# Patient Record
Sex: FEMALE | Race: WHITE | NOT HISPANIC OR LATINO | Employment: FULL TIME | URBAN - METROPOLITAN AREA
[De-identification: names, ages, dates, MRNs, and addresses within clinical notes are randomized per-mention and may not be internally consistent; named-entity substitution may affect disease eponyms.]

---

## 2017-02-09 ENCOUNTER — APPOINTMENT (OUTPATIENT)
Dept: WOMENS IMAGING | Facility: HOSPITAL | Age: 52
End: 2017-02-09

## 2017-02-09 PROCEDURE — 77067 SCR MAMMO BI INCL CAD: CPT | Performed by: RADIOLOGY

## 2017-02-09 PROCEDURE — 77063 BREAST TOMOSYNTHESIS BI: CPT | Performed by: RADIOLOGY

## 2018-02-15 ENCOUNTER — APPOINTMENT (OUTPATIENT)
Dept: WOMENS IMAGING | Facility: HOSPITAL | Age: 53
End: 2018-02-15

## 2018-02-15 PROCEDURE — 77067 SCR MAMMO BI INCL CAD: CPT | Performed by: RADIOLOGY

## 2018-02-15 PROCEDURE — 77063 BREAST TOMOSYNTHESIS BI: CPT | Performed by: RADIOLOGY

## 2018-05-17 ENCOUNTER — OFFICE VISIT CONVERTED (OUTPATIENT)
Dept: CARDIOLOGY | Facility: CLINIC | Age: 53
End: 2018-05-17
Attending: INTERNAL MEDICINE

## 2018-05-29 ENCOUNTER — OFFICE VISIT CONVERTED (OUTPATIENT)
Dept: FAMILY MEDICINE CLINIC | Facility: CLINIC | Age: 53
End: 2018-05-29
Attending: FAMILY MEDICINE

## 2018-11-06 ENCOUNTER — OFFICE VISIT CONVERTED (OUTPATIENT)
Dept: FAMILY MEDICINE CLINIC | Facility: CLINIC | Age: 53
End: 2018-11-06
Attending: NURSE PRACTITIONER

## 2019-02-11 ENCOUNTER — HOSPITAL ENCOUNTER (OUTPATIENT)
Dept: URGENT CARE | Facility: CLINIC | Age: 54
Discharge: HOME | End: 2019-02-11
Attending: FAMILY MEDICINE

## 2019-02-13 LAB — BACTERIA SPEC AEROBE CULT: NORMAL

## 2019-04-24 ENCOUNTER — APPOINTMENT (OUTPATIENT)
Dept: WOMENS IMAGING | Facility: HOSPITAL | Age: 54
End: 2019-04-24

## 2019-04-24 PROCEDURE — 77063 BREAST TOMOSYNTHESIS BI: CPT | Performed by: RADIOLOGY

## 2019-04-24 PROCEDURE — 77067 SCR MAMMO BI INCL CAD: CPT | Performed by: RADIOLOGY

## 2019-05-29 ENCOUNTER — HOSPITAL ENCOUNTER (OUTPATIENT)
Dept: LAB | Facility: HOSPITAL | Age: 54
Discharge: HOME | End: 2019-05-29
Attending: FAMILY MEDICINE

## 2019-05-29 LAB
25(OH)D3 SERPL-MCNC: 39.3 NG/ML (ref 30–100)
ALBUMIN SERPL-MCNC: 4.6 G/DL (ref 3.5–5)
ALBUMIN/GLOB SERPL: 1.5 {RATIO} (ref 1.4–2.6)
ALP SERPL-CCNC: 65 U/L (ref 53–141)
ALT SERPL-CCNC: 19 U/L (ref 10–40)
ANION GAP SERPL CALC-SCNC: 19 MMOL/L (ref 8–19)
AST SERPL-CCNC: 20 U/L (ref 15–50)
BASOPHILS # BLD AUTO: 0.03 10*3/UL (ref 0–0.2)
BASOPHILS NFR BLD AUTO: 0.6 % (ref 0–3)
BILIRUB SERPL-MCNC: 0.62 MG/DL (ref 0.2–1.3)
BUN SERPL-MCNC: 18 MG/DL (ref 5–25)
BUN/CREAT SERPL: 17 {RATIO} (ref 6–20)
CALCIUM SERPL-MCNC: 9.8 MG/DL (ref 8.7–10.4)
CHLORIDE SERPL-SCNC: 105 MMOL/L (ref 99–111)
CHOLEST SERPL-MCNC: 198 MG/DL (ref 107–200)
CHOLEST/HDLC SERPL: 2.1 {RATIO} (ref 3–6)
CONV ABS IMM GRAN: 0.01 10*3/UL (ref 0–0.2)
CONV CO2: 26 MMOL/L (ref 22–32)
CONV CREATININE URINE, RANDOM: 210.2 MG/DL (ref 10–300)
CONV IMMATURE GRAN: 0.2 % (ref 0–1.8)
CONV MICROALBUM.,U,RANDOM: 12.3 MG/L (ref 0–20)
CONV TOTAL PROTEIN: 7.6 G/DL (ref 6.3–8.2)
CREAT UR-MCNC: 1.04 MG/DL (ref 0.5–0.9)
DEPRECATED RDW RBC AUTO: 44.3 FL (ref 36.4–46.3)
EOSINOPHIL # BLD AUTO: 0.13 10*3/UL (ref 0–0.7)
EOSINOPHIL # BLD AUTO: 2.7 % (ref 0–7)
ERYTHROCYTE [DISTWIDTH] IN BLOOD BY AUTOMATED COUNT: 13.6 % (ref 11.7–14.4)
GFR SERPLBLD BASED ON 1.73 SQ M-ARVRAT: >60 ML/MIN/{1.73_M2}
GLOBULIN UR ELPH-MCNC: 3 G/DL (ref 2–3.5)
GLUCOSE SERPL-MCNC: 92 MG/DL (ref 65–99)
HBA1C MFR BLD: 14.3 G/DL (ref 12–16)
HCT VFR BLD AUTO: 44.7 % (ref 37–47)
HDLC SERPL-MCNC: 93 MG/DL (ref 40–60)
LDLC SERPL CALC-MCNC: 91 MG/DL (ref 70–100)
LYMPHOCYTES # BLD AUTO: 1.34 10*3/UL (ref 1–5)
MCH RBC QN AUTO: 28.6 PG (ref 27–31)
MCHC RBC AUTO-ENTMCNC: 32 G/DL (ref 33–37)
MCV RBC AUTO: 89.4 FL (ref 81–99)
MICROALBUMIN/CREAT UR: 5.9 MG/G{CRE} (ref 0–35)
MONOCYTES # BLD AUTO: 0.41 10*3/UL (ref 0.2–1.2)
MONOCYTES NFR BLD AUTO: 8.4 % (ref 3–10)
NEUTROPHILS # BLD AUTO: 2.96 10*3/UL (ref 2–8)
NEUTROPHILS NFR BLD AUTO: 60.6 % (ref 30–85)
NRBC CBCN: 0 % (ref 0–0.7)
OSMOLALITY SERPL CALC.SUM OF ELEC: 304 MOSM/KG (ref 273–304)
PLATELET # BLD AUTO: 200 10*3/UL (ref 130–400)
PMV BLD AUTO: 11.6 FL (ref 9.4–12.3)
POTASSIUM SERPL-SCNC: 4.4 MMOL/L (ref 3.5–5.3)
RBC # BLD AUTO: 5 10*6/UL (ref 4.2–5.4)
SODIUM SERPL-SCNC: 146 MMOL/L (ref 135–147)
T4 FREE SERPL-MCNC: 1.8 NG/DL (ref 0.9–1.8)
TRIGL SERPL-MCNC: 68 MG/DL (ref 40–150)
TSH SERPL-ACNC: 0.85 M[IU]/L (ref 0.27–4.2)
VARIANT LYMPHS NFR BLD MANUAL: 27.5 % (ref 20–45)
VLDLC SERPL-MCNC: 14 MG/DL (ref 5–37)
WBC # BLD AUTO: 4.88 10*3/UL (ref 4.8–10.8)

## 2019-06-27 ENCOUNTER — OFFICE VISIT CONVERTED (OUTPATIENT)
Dept: FAMILY MEDICINE CLINIC | Facility: CLINIC | Age: 54
End: 2019-06-27
Attending: NURSE PRACTITIONER

## 2019-06-27 ENCOUNTER — HOSPITAL ENCOUNTER (OUTPATIENT)
Dept: LAB | Facility: HOSPITAL | Age: 54
Discharge: HOME | End: 2019-06-27
Attending: NURSE PRACTITIONER

## 2019-06-27 LAB
ALBUMIN SERPL-MCNC: 4.7 G/DL (ref 3.5–5)
ALBUMIN/GLOB SERPL: 1.8 {RATIO} (ref 1.4–2.6)
ALP SERPL-CCNC: 62 U/L (ref 53–141)
ALT SERPL-CCNC: 22 U/L (ref 10–40)
ANION GAP SERPL CALC-SCNC: 20 MMOL/L (ref 8–19)
AST SERPL-CCNC: 21 U/L (ref 15–50)
BILIRUB SERPL-MCNC: 0.48 MG/DL (ref 0.2–1.3)
BUN SERPL-MCNC: 18 MG/DL (ref 5–25)
BUN/CREAT SERPL: 16 {RATIO} (ref 6–20)
CALCIUM SERPL-MCNC: 9.5 MG/DL (ref 8.7–10.4)
CHLORIDE SERPL-SCNC: 102 MMOL/L (ref 99–111)
CONV CO2: 24 MMOL/L (ref 22–32)
CONV TOTAL PROTEIN: 7.3 G/DL (ref 6.3–8.2)
CREAT UR-MCNC: 1.1 MG/DL (ref 0.5–0.9)
GFR SERPLBLD BASED ON 1.73 SQ M-ARVRAT: 56 ML/MIN/{1.73_M2}
GLOBULIN UR ELPH-MCNC: 2.6 G/DL (ref 2–3.5)
GLUCOSE SERPL-MCNC: 98 MG/DL (ref 65–99)
OSMOLALITY SERPL CALC.SUM OF ELEC: 296 MOSM/KG (ref 273–304)
POTASSIUM SERPL-SCNC: 3.9 MMOL/L (ref 3.5–5.3)
SODIUM SERPL-SCNC: 142 MMOL/L (ref 135–147)

## 2019-07-25 ENCOUNTER — OFFICE VISIT CONVERTED (OUTPATIENT)
Dept: FAMILY MEDICINE CLINIC | Facility: CLINIC | Age: 54
End: 2019-07-25
Attending: FAMILY MEDICINE

## 2019-11-18 ENCOUNTER — OFFICE VISIT CONVERTED (OUTPATIENT)
Dept: SURGERY | Facility: CLINIC | Age: 54
End: 2019-11-18
Attending: SURGERY

## 2019-12-05 ENCOUNTER — HOSPITAL ENCOUNTER (OUTPATIENT)
Dept: PERIOP | Facility: HOSPITAL | Age: 54
Setting detail: HOSPITAL OUTPATIENT SURGERY
Discharge: HOME | End: 2019-12-05
Attending: SURGERY

## 2019-12-20 ENCOUNTER — OFFICE VISIT CONVERTED (OUTPATIENT)
Dept: SURGERY | Facility: CLINIC | Age: 54
End: 2019-12-20
Attending: SURGERY

## 2020-02-06 ENCOUNTER — OFFICE VISIT CONVERTED (OUTPATIENT)
Dept: FAMILY MEDICINE CLINIC | Facility: CLINIC | Age: 55
End: 2020-02-06
Attending: FAMILY MEDICINE

## 2020-07-06 ENCOUNTER — CONVERSION ENCOUNTER (OUTPATIENT)
Dept: FAMILY MEDICINE CLINIC | Facility: CLINIC | Age: 55
End: 2020-07-06

## 2020-07-06 ENCOUNTER — HOSPITAL ENCOUNTER (OUTPATIENT)
Dept: LAB | Facility: HOSPITAL | Age: 55
Discharge: HOME | End: 2020-07-06
Attending: NURSE PRACTITIONER

## 2020-07-06 ENCOUNTER — HOSPITAL ENCOUNTER (OUTPATIENT)
Dept: GENERAL RADIOLOGY | Facility: HOSPITAL | Age: 55
Discharge: HOME | End: 2020-07-06
Attending: NURSE PRACTITIONER

## 2020-07-06 ENCOUNTER — OFFICE VISIT CONVERTED (OUTPATIENT)
Dept: FAMILY MEDICINE CLINIC | Facility: CLINIC | Age: 55
End: 2020-07-06
Attending: NURSE PRACTITIONER

## 2020-07-06 LAB
AMYLASE SERPL-CCNC: 51 U/L (ref 30–110)
BASOPHILS # BLD AUTO: 0.02 10*3/UL (ref 0–0.2)
BASOPHILS NFR BLD AUTO: 0.3 % (ref 0–3)
CONV ABS IMM GRAN: 0.01 10*3/UL (ref 0–0.2)
CONV IMMATURE GRAN: 0.2 % (ref 0–1.8)
DEPRECATED RDW RBC AUTO: 42.3 FL (ref 36.4–46.3)
EOSINOPHIL # BLD AUTO: 0.07 10*3/UL (ref 0–0.7)
EOSINOPHIL # BLD AUTO: 1.1 % (ref 0–7)
ERYTHROCYTE [DISTWIDTH] IN BLOOD BY AUTOMATED COUNT: 13.1 % (ref 11.7–14.4)
HCT VFR BLD AUTO: 44.2 % (ref 37–47)
HGB BLD-MCNC: 14.4 G/DL (ref 12–16)
LIPASE SERPL-CCNC: 24 U/L (ref 5–51)
LYMPHOCYTES # BLD AUTO: 1.4 10*3/UL (ref 1–5)
LYMPHOCYTES NFR BLD AUTO: 21.9 % (ref 20–45)
MCH RBC QN AUTO: 28.9 PG (ref 27–31)
MCHC RBC AUTO-ENTMCNC: 32.6 G/DL (ref 33–37)
MCV RBC AUTO: 88.6 FL (ref 81–99)
MONOCYTES # BLD AUTO: 0.38 10*3/UL (ref 0.2–1.2)
MONOCYTES NFR BLD AUTO: 5.9 % (ref 3–10)
NEUTROPHILS # BLD AUTO: 4.52 10*3/UL (ref 2–8)
NEUTROPHILS NFR BLD AUTO: 70.6 % (ref 30–85)
NRBC CBCN: 0 % (ref 0–0.7)
PLATELET # BLD AUTO: 204 10*3/UL (ref 130–400)
PMV BLD AUTO: 11.6 FL (ref 9.4–12.3)
RBC # BLD AUTO: 4.99 10*6/UL (ref 4.2–5.4)
WBC # BLD AUTO: 6.4 10*3/UL (ref 4.8–10.8)

## 2020-07-23 ENCOUNTER — HOSPITAL ENCOUNTER (OUTPATIENT)
Dept: LAB | Facility: HOSPITAL | Age: 55
Discharge: HOME | End: 2020-07-23
Attending: FAMILY MEDICINE

## 2020-07-23 LAB
25(OH)D3 SERPL-MCNC: 33.2 NG/ML (ref 30–100)
ALBUMIN SERPL-MCNC: 4.3 G/DL (ref 3.5–5)
ALBUMIN/GLOB SERPL: 1.5 {RATIO} (ref 1.4–2.6)
ALP SERPL-CCNC: 56 U/L (ref 53–141)
ALT SERPL-CCNC: 15 U/L (ref 10–40)
ANION GAP SERPL CALC-SCNC: 19 MMOL/L (ref 8–19)
APPEARANCE UR: ABNORMAL
AST SERPL-CCNC: 19 U/L (ref 15–50)
BASOPHILS # BLD AUTO: 0.03 10*3/UL (ref 0–0.2)
BASOPHILS NFR BLD AUTO: 0.6 % (ref 0–3)
BILIRUB SERPL-MCNC: 0.39 MG/DL (ref 0.2–1.3)
BILIRUB UR QL: NEGATIVE
BUN SERPL-MCNC: 18 MG/DL (ref 5–25)
BUN/CREAT SERPL: 19 {RATIO} (ref 6–20)
CALCIUM SERPL-MCNC: 9.6 MG/DL (ref 8.7–10.4)
CHLORIDE SERPL-SCNC: 105 MMOL/L (ref 99–111)
CHOLEST SERPL-MCNC: 175 MG/DL (ref 107–200)
CHOLEST/HDLC SERPL: 2.2 {RATIO} (ref 3–6)
COLOR UR: YELLOW
CONV ABS IMM GRAN: 0.01 10*3/UL (ref 0–0.2)
CONV BACTERIA: NEGATIVE
CONV CO2: 24 MMOL/L (ref 22–32)
CONV COLLECTION SOURCE (UA): ABNORMAL
CONV CREATININE URINE, RANDOM: 198.2 MG/DL (ref 10–300)
CONV HIV-1/ HIV-2: NONREACTIVE
CONV HYALINE CASTS IN URINE MICRO: ABNORMAL /[LPF]
CONV IMMATURE GRAN: 0.2 % (ref 0–1.8)
CONV MICROALBUM.,U,RANDOM: <12 MG/L (ref 0–20)
CONV TOTAL PROTEIN: 7.1 G/DL (ref 6.3–8.2)
CONV UROBILINOGEN IN URINE BY AUTOMATED TEST STRIP: 0.2 {EHRLICHU}/DL (ref 0.1–1)
CREAT UR-MCNC: 0.97 MG/DL (ref 0.5–0.9)
DEPRECATED RDW RBC AUTO: 42.7 FL (ref 36.4–46.3)
EOSINOPHIL # BLD AUTO: 0.19 10*3/UL (ref 0–0.7)
EOSINOPHIL # BLD AUTO: 3.5 % (ref 0–7)
ERYTHROCYTE [DISTWIDTH] IN BLOOD BY AUTOMATED COUNT: 13.3 % (ref 11.7–14.4)
GFR SERPLBLD BASED ON 1.73 SQ M-ARVRAT: >60 ML/MIN/{1.73_M2}
GLOBULIN UR ELPH-MCNC: 2.8 G/DL (ref 2–3.5)
GLUCOSE SERPL-MCNC: 97 MG/DL (ref 65–99)
GLUCOSE UR QL: NEGATIVE MG/DL
HCT VFR BLD AUTO: 43 % (ref 37–47)
HDLC SERPL-MCNC: 81 MG/DL (ref 40–60)
HGB BLD-MCNC: 14 G/DL (ref 12–16)
HGB UR QL STRIP: NEGATIVE
KETONES UR QL STRIP: NEGATIVE MG/DL
LDLC SERPL CALC-MCNC: 78 MG/DL (ref 70–100)
LEUKOCYTE ESTERASE UR QL STRIP: NEGATIVE
LYMPHOCYTES # BLD AUTO: 1.45 10*3/UL (ref 1–5)
LYMPHOCYTES NFR BLD AUTO: 27 % (ref 20–45)
MCH RBC QN AUTO: 28.9 PG (ref 27–31)
MCHC RBC AUTO-ENTMCNC: 32.6 G/DL (ref 33–37)
MCV RBC AUTO: 88.7 FL (ref 81–99)
MICROALBUMIN/CREAT UR: 6.1 MG/G{CRE} (ref 0–35)
MONOCYTES # BLD AUTO: 0.38 10*3/UL (ref 0.2–1.2)
MONOCYTES NFR BLD AUTO: 7.1 % (ref 3–10)
NEUTROPHILS # BLD AUTO: 3.32 10*3/UL (ref 2–8)
NEUTROPHILS NFR BLD AUTO: 61.6 % (ref 30–85)
NITRITE UR QL STRIP: NEGATIVE
NRBC CBCN: 0 % (ref 0–0.7)
OSMOLALITY SERPL CALC.SUM OF ELEC: 300 MOSM/KG (ref 273–304)
PH UR STRIP.AUTO: 5 [PH] (ref 5–8)
PLATELET # BLD AUTO: 206 10*3/UL (ref 130–400)
PMV BLD AUTO: 11.3 FL (ref 9.4–12.3)
POTASSIUM SERPL-SCNC: 4.1 MMOL/L (ref 3.5–5.3)
PROT UR QL: NEGATIVE MG/DL
RBC # BLD AUTO: 4.85 10*6/UL (ref 4.2–5.4)
RBC #/AREA URNS HPF: ABNORMAL /[HPF]
SODIUM SERPL-SCNC: 144 MMOL/L (ref 135–147)
SP GR UR: 1.02 (ref 1–1.03)
T4 FREE SERPL-MCNC: 1.6 NG/DL (ref 0.9–1.8)
TRIGL SERPL-MCNC: 81 MG/DL (ref 40–150)
TSH SERPL-ACNC: 0.32 M[IU]/L (ref 0.27–4.2)
VLDLC SERPL-MCNC: 16 MG/DL (ref 5–37)
WBC # BLD AUTO: 5.38 10*3/UL (ref 4.8–10.8)
WBC #/AREA URNS HPF: ABNORMAL /[HPF]

## 2020-07-28 ENCOUNTER — OFFICE VISIT CONVERTED (OUTPATIENT)
Dept: FAMILY MEDICINE CLINIC | Facility: CLINIC | Age: 55
End: 2020-07-28
Attending: FAMILY MEDICINE

## 2020-08-11 ENCOUNTER — HOSPITAL ENCOUNTER (OUTPATIENT)
Dept: GENERAL RADIOLOGY | Facility: HOSPITAL | Age: 55
Discharge: HOME | End: 2020-08-11
Attending: FAMILY MEDICINE

## 2020-08-13 ENCOUNTER — HOSPITAL ENCOUNTER (OUTPATIENT)
Dept: GENERAL RADIOLOGY | Facility: HOSPITAL | Age: 55
Discharge: HOME | End: 2020-08-13
Attending: FAMILY MEDICINE

## 2020-11-19 ENCOUNTER — APPOINTMENT (OUTPATIENT)
Dept: WOMENS IMAGING | Facility: HOSPITAL | Age: 55
End: 2020-11-19

## 2020-11-19 PROCEDURE — 77063 BREAST TOMOSYNTHESIS BI: CPT | Performed by: RADIOLOGY

## 2020-11-19 PROCEDURE — 77067 SCR MAMMO BI INCL CAD: CPT | Performed by: RADIOLOGY

## 2020-12-29 ENCOUNTER — OFFICE VISIT CONVERTED (OUTPATIENT)
Dept: FAMILY MEDICINE CLINIC | Facility: CLINIC | Age: 55
End: 2020-12-29
Attending: FAMILY MEDICINE

## 2020-12-29 ENCOUNTER — CONVERSION ENCOUNTER (OUTPATIENT)
Dept: FAMILY MEDICINE CLINIC | Facility: CLINIC | Age: 55
End: 2020-12-29

## 2021-01-22 ENCOUNTER — HOSPITAL ENCOUNTER (OUTPATIENT)
Dept: GENERAL RADIOLOGY | Facility: HOSPITAL | Age: 56
Discharge: HOME | End: 2021-01-22
Attending: FAMILY MEDICINE

## 2021-05-13 NOTE — PROGRESS NOTES
Progress Note      Patient Name: Payal Eldridge   Patient ID: [id number]   Sex: Female   YOB: 1965    Primary Care Provider: Luis Carlos Rincon MD   Referring Provider: Luis Carlos Rincon MD    Visit Date: July 28, 2020    Provider: Luis Carlos Rincon MD   Location: [facility]   Location Address: [address] [place] [address], [address] [place] [address]  [id number]   Location Phone: [phone]          Chief Complaint     Follow up labs       History Of Present Illness  The patient is a 55 year old /White female who has a history of anxiety, costochondritis, IBS, hypothyroidism. She costochondritis Medications are listed in the medication list.   TESTS:     Hx of IBS.    Hx of anxiety. she has been more anxious during COVID.    SHe c/o RUQ intermittently since her gallbladder was removed last year.      Hx of hypothyroidism. controlled.    Costochondritis.  She has intermittent pain of the anterior left chest wall. takes Tylenol prn.           Past Medical History  Disease Name Date Onset Notes   Abnormal PAP Smear --  dr. marroquin   Anxiety --  --    Cardiac Dysrhythmia --  sees Dr. Villalpando.   Chemotherapy/Cancer --  melanoma on abdomen. excised. see dermatologist yearly.   Costochondritis --  sees Dr. Villalpando.   Endometriosis of uterus --  --    Gall Stones --  --    Hypothyroidism --  --    Irritable Bowel Syndrome --  sees GI doctor   Melenoma --  --    Migraine Headaches --  --    Screening Mammogram 04/2019 --          Past Surgical History  Procedure Name Date Notes   Cholecystectomy --  --    Colonoscopy 2017 --    Dilation and curretage --  --    Hysterectomy --  --    Lasik --  --    Tubal ligation --  --          Medication List  Name Date Started Instructions   Synthroid 137 mcg oral tablet 08/26/2019 take 1 tablet (137 mcg) by oral route once daily for 30 days         Allergy List  Allergen Name Date Reaction Notes   NO KNOWN DRUG ALLERGIES --  --  --    Seasonal --  --  sees goldsmith and pippa.          Family Medical History  Disease Name Relative/Age Notes   Stroke  --    Colon Cancer  dad. Diagnosed at 53.         Reproductive History  Menstrual   Certainty of LMP Date:  Menopause Status: Perimenopausal Method of Birth Control: Sterilization   Pregnancy Summary   Total Pregnancies: 4 Full Term: 0 Premature: 0   Ab Induced: 0 Ab Spontaneous: 0 Ectopics: 0   Multiples: 0 Livin         Social History  Finding Status Start/Stop Quantity Notes   Alcohol --  --/-- --  --    Financial --  --/-- --      --  --/-- --  --    Sedentary --  --/-- --  --    Tobacco Never --/-- --  --          Immunizations  NameDate Admin Mfg Trade Name Lot Number Route Inj VIS Given VIS Publication   Hepatitis [id number] NE Not Entered  NE NE     Comments:    Hepatitis [id number] NE Not Entered  NE NE     Comments:    Hepatitis A02018 NE Not Entered  NE NE     Comments:    Hepatitis A02018 NE Not Entered  NE NE     Comments:    Yvriklqgc83/30/2019 NE Not Entered  NE NE     Comments: @ work   InfluenzaRefused 2019 NE Not Entered  NE NE     Comments:    InfluenzaRefused 2019 NE Not Entered  NE NE     Comments:    Prevnar  [facility] PREVNAR 13 [id number]  [place] 2019    Comments: Pt. left office in stable condition.   Prevnar  [facility] PREVNAR 13 [id number]  [place] 2019    Comments: Pt. left office in stable condition.   Tdap03/10/2014 [facility] BOOSTRIX [id number] IM RD 03/10/2014 2012   Comments:          Review of Systems  · Constitutional  o Denies  o : fever, weight gain  · Eyes  o Denies  o : diplopia, recent changes, blurred vision, redness of eye, eye pain, discharge from eye  · HENT  o Denies  o : sore throat, hearing changes, tinnitus, nose bleeding, sinus pain, nasal discharge, ear pain  · Cardiovascular  o Denies  o : palpitation, chest pain, claudication, pedal edema  · Respiratory  o Denies  o : shortness of breath, ROBERT, PND/Orthopnea, hemoptysis, dry cough,  "productive cough  · Gastrointestinal  o Denies  o : nausea, vomiting, reflux  · Genitourinary  o Denies  o : frequency, urgency, dysuria, vaginal discharge, penile discharge, incontinence, nocturia, irregular menses, hot flashes  · Neurologic  o Denies  o : unsteady gait, weakness, dizziness, H/A  · Musculoskeletal  o Admits  o : costochondritis pain  o Denies  o : myalgias, joint pain, joint swelling  · Endocrine  o Denies  o : heat intolerance, cold intolerance, polyuria, polydipsia  · Psychiatric  o Denies  o : suicidal ideation, homicidal ideation, mood changes, hallucinations, memory  · Heme-Lymph  o Denies  o : easy bleeding, easy bruising, edema, lymph node enlargement or tenderness  · Allergic-Immunologic  o Denies  o : bees, frequent illnesses, seasonal allergies      Vitals  Date Time BP Position Site L\R Cuff Size HR RR TEMP (F) WT  HT  BMI kg/m2 BSA m2 O2 Sat HC       07/28/2020 07:13 /72 Sitting    66 - R  98 146lbs 9oz 5'  5\" 24.39 1.75 99 %          Physical Examination  · Constitutional  o Appearance  o : alert, in no acute distress, well developed, well-nourished  · Head and Face  o Head  o : normocephalic, atraumatic, non tender, no palpable masses or nodules.  o Face  o : no facial lesions  · Eyes  o Vision  o : Acuity: grossly normal at distance, Conjuntivae: Normal, Sclerae white  · Respiratory  o Auscultation of Lungs  o : normal breath sounds throughout  · Cardiovascular  o Heart  o : Regular rate and rhythm, Normal S1,S2   · Psychiatric  o Mood and Affect  o : normal mood and affect          Assessment  · Abdominal pain     789.00/R10.9  · Hypothyroidism     244.9/E03.9  · Screening for cardiovascular condition     V81.2/Z13.6  · Vitamin D deficiency     268.9/E55.9  · Routine lab draw     V72.60/Z01.89       RUQ ultrasound      f/u in 1 year.         she declines any meds to help with anxiety    get back on probiotics         Plan  · Orders  o Physical, Primary Care Panel (CBC, CMP, " Lipid, TSH) [facility] (25803, 53962, 34704, 26368) - 789.00/R10.9, 244.9/E03.9, V81.2/Z13.6, V72.60/Z01.89 - 07/28/2021  o Free T4 (44304) - 244.9/E03.9, V72.60/Z01.89 - 07/28/2021  o Urinalysis with Reflex Microscopy if abnormal ([facility]) (76241) - 789.00/R10.9, 244.9/E03.9, V72.60/Z01.89 - 07/28/2021  o Vitamin D (25-Hydroxy) Level (85915) - 268.9/E55.9 - 07/28/2021  o Urine microalbumin (60725) - 789.00/R10.9, 244.9/E03.9, V72.60/Z01.89 - 07/28/2021  o Right upper quadrant ultrasound (70296) - 789.00/R10.9 - 07/28/2020  o ACO-14: Influenza immunization administered or previously received () - - 07/28/2020  o ACO-39: Current medications updated and reviewed () - - 07/28/2020  · Medications  o Medications have been Reconciled  o Transition of Care or Provider Policy  · Instructions  o Electronically Identified Patient Education Materials Provided Electronically  · Disposition  o Call or Return if symptoms worsen or persist.  o Care Transition            Electronically Signed by: Luis Carlos Rincon MD -Author on July 28, 2020 12:42:08 PM

## 2021-05-13 NOTE — PROGRESS NOTES
Progress Note      Patient Name: Payal Eldridge   Patient ID: [id number]   Sex: Female   YOB: 1965    Primary Care Provider: Luis Carlos Rincon MD   Referring Provider: Luis Carlos Rincon MD    Visit Date: July 6, 2020    Provider: HERNAN Oswald   Location: [facility]   Location Address: [address] [place] [address], [address] [place] [address]  [id number]   Location Phone: [phone]          Chief Complaint  · right side pain x 4-5 days. Had gallbladder removed in Dec  · left thigh pain x 1 week      History Of Present Illness  Payal Eldridge is a 55 year old /White female who presents for evaluation and treatment of: pt having pain epigastric area for 4-5 days. she has some trouble with constipation. she's had some pain in her inner L thigh but thinks it could be because of how she's sitting.       Past Medical History  Disease Name Date Onset Notes   Abnormal PAP Smear --  dr. marroquin   Anxiety --  --    Cardiac Dysrhythmia --  sees Dr. Villalpando.   Chemotherapy/Cancer --  melanoma on abdomen. excised. see dermatologist yearly.   Costochondritis --  sees Dr. Villalpando.   Endometriosis of uterus --  --    Gall Stones --  --    Hypothyroidism --  --    Irritable Bowel Syndrome --  sees GI doctor   Melenoma --  --    Migraine Headaches --  --    Screening Mammogram 04/2019 --          Past Surgical History  Procedure Name Date Notes   Cholecystectomy --  --    Colonoscopy 2017 --    Dilation and curretage --  --    Hysterectomy --  --    Lasik --  --    Tubal ligation --  --          Medication List  Name Date Started Instructions   Synthroid 137 mcg oral tablet 08/26/2019 take 1 tablet (137 mcg) by oral route once daily for 30 days         Allergy List  Allergen Name Date Reaction Notes   NO KNOWN DRUG ALLERGIES --  --  --    Seasonal --  --  sees goldsmith and pippa.         Family Medical History  Disease Name Relative/Age Notes   Stroke  --    Colon Cancer  dad. Diagnosed at 53.          Reproductive History  Menstrual   Certainty of LMP Date:  Menopause Status: Perimenopausal Method of Birth Control: Sterilization   Pregnancy Summary   Total Pregnancies: 4 Full Term: 0 Premature: 0   Ab Induced: 0 Ab Spontaneous: 0 Ectopics: 0   Multiples: 0 Livin         Social History  Finding Status Start/Stop Quantity Notes   Alcohol --  --/-- --  --    Financial --  --/-- --      --  --/-- --  --    Sedentary --  --/-- --  --    Tobacco Never --/-- --  --          Immunizations  NameDate Admin Mfg Trade Name Lot Number Route Inj VIS Given VIS Publication   Hepatitis A12018 NE Not Entered  NE NE     Comments:    Hepatitis A12018 NE Not Entered  NE NE     Comments:    Hepatitis A02018 NE Not Entered  NE NE     Comments:    Hepatitis A02018 NE Not Entered  NE NE     Comments:    Qzyofhdvv04/30/2019 NE Not Entered  NE NE     Comments: @ work   InfluenzaRefused 2019 NE Not Entered  NE NE     Comments:    InfluenzaRefused 2019 NE Not Entered  NE NE     Comments:    Prevnar  [facility] PREVNAR 13 [id number]  [place] 2019    Comments: Pt. left office in stable condition.   Prevnar 1302019 [facility] PREVNAR 13 [id number]  [place] 2019    Comments: Pt. left office in stable condition.   Tdap03/10/2014 [facility] BOOSTRIX [id number] IM RD 03/10/2014 2012   Comments:          Review of Systems  · Constitutional  o Denies  o : fatigue, night sweats  · Eyes  o Denies  o : double vision, blurred vision  · HENT  o Denies  o : vertigo, recent head injury  · Breasts  o Denies  o : abnormal changes in breast size, additional breast symptoms except as noted in the HPI  · Cardiovascular  o Denies  o : chest pain, irregular heart beats  · Respiratory  o Denies  o : shortness of breath, productive cough  · Gastrointestinal  o Admits  o : has constipation probs, has epigastric pain for 4-5 days. is able to eat and drink. she was taking miralax and it worked but she stopped  "it. since her gallbladder is out she's had trouble with her bowels. she may not go for 3-4 days   o Denies  o : nausea, vomiting  · Genitourinary  o Denies  o : dysuria, urinary retention  · Integument  o Denies  o : hair growth change, new skin lesions  · Neurologic  o Denies  o : altered mental status, seizures  · Musculoskeletal  o Admits  o : she crosses and sits on her legs in a regular chair as she has been working at home.  o Denies  o : joint swelling, limitation of motion  · Endocrine  o Denies  o : cold intolerance, heat intolerance  · Heme-Lymph  o Denies  o : petechiae, lymph node enlargement or tenderness  · Allergic-Immunologic  o Denies  o : frequent illnesses      Vitals  Date Time BP Position Site L\R Cuff Size HR RR TEMP (F) WT  HT  BMI kg/m2 BSA m2 O2 Sat HC       07/06/2020 11:37 /80 Sitting    67 - R 12 97.7 146lbs 4oz 5'  5\" 24.34 1.74 98 %          Physical Examination  · Constitutional  o Appearance  o : well-nourished, well developed, alert, in no acute distress  · Neck  o Inspection/Palpation  o : normal appearance, no masses or tenderness, trachea midline, no enlarged cervical or supraclavicular lymphnodes palpated  o Thyroid  o : gland size normal, nontender, no nodules or masses present on palpation, thyroid motion normal during swallowing  · Respiratory  o Respiratory Effort  o : breathing unlabored  o Inspection of Chest  o : normal appearance, no retractions  o Auscultation of Lungs  o : normal breath sounds throughout  · Cardiovascular  o Heart  o :   § Auscultation of Heart  § : regular rate and rhythm without murmur  · Gastrointestinal  o Abdominal Examination  o : abdomen nontender to palpation, hypoactive bowel sounds present, tone normal without rigidity or guarding, no masses present,tympany on R side with percussion, more flat across lower abd  · Musculoskeletal  o General  o :   § General Musculoskeletal  § : No joint swelling or deformity., Muscle tone, strength, and " development grossly normal.  o Left Lower Extremity  o :   § Range of Motion  § : range of motion normal. pt able to demonstrate how she sits  · Skin and Subcutaneous Tissue  o General Inspection  o : no rashes or lesions present, no areas of discoloration  · Neurologic  o Mental Status Examination  o : judgement, insight intact, modd and affect appropriate  o Motor Examination  o : strength grossly intact in all four extremities  o Gait and Station  o : normal gait, able to stand without difficulty          Assessment  · Abdominal pain     789.00/R10.9  · Constipation     564.00/K59.00      Plan  · Orders  o CBC with Auto Diff [facility] (09421) - 789.00/R10.9 - 07/06/2020  o ACO-39: Current medications updated and reviewed () - - 07/06/2020  o KUB xray [facility] (43707) - 789.00/R10.9 - 07/06/2020   does have constipation problems at times  o Helicobacter pylori antibody assay by immunoassay (20679) - 789.00/R10.9 - 07/06/2020  o Serum amylase measurement (87794) - 789.00/R10.9 - 07/06/2020  o Lipase measurement (52156) - 789.00/R10.9 - 07/06/2020  o Helicobacter pylori IgM antibody assay (05059) - 789.00/R10.9 - 07/07/2020  · Medications  o Align 4 mg Oral Capsule   SIG: take 1 capsule by oral route daily   DISP: (0) capsule with 0 refills  Discontinued on 07/06/2020     o Cholestyramine Light 4 gram oral powder   SIG: take 4gms po daily as needed   DISP: (10) Packet with 5 refills  Discontinued on 07/06/2020     o Medrol (Faizan) 4 mg oral tablets,dose pack   SIG: take as directed   DISP: (1) 21 ct dose-pack with 0 refills  Discontinued on 07/06/2020     o multivitamin Oral Tablet   SIG: take 1 tablet by oral route once daily with food   DISP: (0) tablet with 0 refills  Discontinued on 07/06/2020     o Shingrix (PF) 50 mcg/0.5 mL intramuscular suspension for reconstitution   SIG: inject 0.5 milliliter (50 mcg) by intramuscular route once repeat 0.5 mL dose 2 to 6 months after the first dose (total of 2  doses)   DISP: (1) Kit with 0 refills  Discontinued on 07/06/2020     o Medications have been Reconciled  o Transition of Care or Provider Policy  · Instructions  o Instructed to seek medical attention urgently for new or worsening symptoms.  o Take all medications as prescribed/directed.  o Patient was educated/instructed on their diagnosis, treatment and medications prior to discharge from the clinic today.  o recommend she restart the miralax to get self regulated  · Disposition  o Call or Return if symptoms worsen or persist.            Electronically Signed by: Cora Farley APRN -Author on July 7, 2020 09:40:50 AM

## 2021-05-14 VITALS
SYSTOLIC BLOOD PRESSURE: 122 MMHG | OXYGEN SATURATION: 95 % | HEIGHT: 65 IN | DIASTOLIC BLOOD PRESSURE: 72 MMHG | RESPIRATION RATE: 18 BRPM | TEMPERATURE: 97.1 F | WEIGHT: 151.44 LBS | BODY MASS INDEX: 25.23 KG/M2 | HEART RATE: 63 BPM

## 2021-05-14 NOTE — PROGRESS NOTES
Progress Note      Patient Name: Payal Eldridge   Patient ID: [id number]   Sex: Female   YOB: 1965    Primary Care Provider: Luis Carlos Rincon MD   Referring Provider: Luis Carlos Rincon MD    Visit Date: December 29, 2020    Provider: Luis Carlos Rincon MD   Location: [facility]   Location Address: [address] [place] [address], [address] [place] [address]  [id number]   Location Phone: [phone]          Chief Complaint     Abd pain and burning       History Of Present Illness  Payal Eldridge is a 55 year old /White female who presents for evaluation and treatment of:      Pt presents for eval    SHe c/o intermittent bouts of (mid-epigastric) abdominalpain. Pain is intermittnet, usually worse at night. Per pt, it feels like a burning sensation. Denies blood in stool or black stools. Denies weight loss. Denies vomiting or loss of appetite.       Past Medical History  Disease Name Date Onset Notes   Abnormal PAP Smear --  dr. marroquin   Anxiety --  --    Cardiac Dysrhythmia --  sees Dr. Villalpando.   Chemotherapy/Cancer --  melanoma on abdomen. excised. see dermatologist yearly.   Costochondritis --  sees Dr. Villalpando.   Endometriosis of uterus --  --    Gall Stones --  --    Hypothyroidism --  --    Irritable Bowel Syndrome --  sees GI doctor   Melenoma --  --    Migraine Headaches --  --    Screening Mammogram 04/2019 --          Past Surgical History  Procedure Name Date Notes   Cholecystectomy --  --    Colonoscopy 2017 --    Dilation and curretage --  --    Hysterectomy --  --    Lasik --  --    Tubal ligation --  --          Medication List  Name Date Started Instructions   omeprazole 20 mg oral capsule,delayed release(/EC) 12/29/2020 take 1 capsule (20 mg) by oral route once daily before a meal for 30 days   Synthroid 137 mcg oral tablet 08/14/2020 take 1 tablet (137 mcg) by oral route once daily for 30 days         Allergy List  Allergen Name Date Reaction Notes   NO  KNOWN DRUG ALLERGIES --  --  --    Seasonal --  --  sees goldsmith and pippa.       Allergies Reconciled  Family Medical History  Disease Name Relative/Age Notes   Stroke  --    Colon Cancer  dad. Diagnosed at 53.         Reproductive History  Menstrual   Certainty of LMP Date:  Menopause Status: Perimenopausal Method of Birth Control: Sterilization   Pregnancy Summary   Total Pregnancies: 4 Full Term: 0 Premature: 0   Ab Induced: 0 Ab Spontaneous: 0 Ectopics: 0   Multiples: 0 Livin         Social History  Finding Status Start/Stop Quantity Notes   Alcohol --  --/-- --  --    Financial --  --/-- --      --  --/-- --  --    Sedentary --  --/-- --  --    Tobacco Never --/-- --  --          Immunizations  NameDate Admin Mfg Trade Name Lot Number Route Inj VIS Given VIS Publication   Hepatitis A12018 NE Not Entered  NE NE     Comments:    Hepatitis A02018 NE Not Entered  NE NE     Comments:    Zmzhelupm78/30/2019 NE Not Entered  NE NE     Comments: @ work   Prevnar 1302019 [facility] PREVNAR 13 [id number] IM [place] 2019    Comments: Pt. left office in stable condition.   Tdap03/10/2014 [facility] BOOSTRIX [id number] IM RD 03/10/2014 2012   Comments:          Review of Systems  · Constitutional  o Denies  o : night sweats  · Eyes  o Denies  o : double vision, blurred vision  · HENT  o Denies  o : vertigo, recent head injury  · Breasts  o Denies  o : abnormal changes in breast size, additional breast symptoms except as noted in the HPI  · Cardiovascular  o Denies  o : chest pain, irregular heart beats  · Respiratory  o Denies  o : shortness of breath, productive cough  · Gastrointestinal  o Denies  o : nausea, vomiting  · Genitourinary  o Denies  o : dysuria, urinary retention  · Integument  o Denies  o : hair growth change, new skin lesions  · Neurologic  o Denies  o : altered mental status, seizures  · Musculoskeletal  o Denies  o : joint swelling, limitation of motion  · Endocrine  o Denies  o : cold  "intolerance, heat intolerance  · Heme-Lymph  o Denies  o : petechiae, lymph node enlargement or tenderness  · Allergic-Immunologic  o Denies  o : frequent illnesses      Vitals  Date Time BP Position Site L\R Cuff Size HR RR TEMP (F) WT  HT  BMI kg/m2 BSA m2 O2 Sat FR L/min FiO2 HC       12/29/2020 03:06 /72 Sitting    63 - R 18 97.1 151lbs 7oz 5'  5\" 25.2 1.77 95 %  21%          Physical Examination  · Constitutional  o Appearance  o : alert, in no acute distress, well developed, well-nourished  · Head and Face  o Head  o : normocephalic, atraumatic, non tender, no palpable masses or nodules.  o Face  o : no facial lesions  · Eyes  o Vision  o : Acuity: grossly normal at distance, Conjuntivae: Normal, Sclerae white  · Respiratory  o Auscultation of Lungs  o : normal breath sounds throughout  · Cardiovascular  o Heart  o : Regular rate and rhythm, Normal S1,S2   · Psychiatric  o Mood and Affect  o : normal mood and affect          Assessment  · GERD (gastroesophageal reflux disease)     530.81/K21.9       f/u as directed.  start prilosec          Plan  · Orders  o ACO-39: Current medications updated and reviewed (, 1159F) - - 12/29/2020  o ACO-14: Influenza immunization administered or previously received [facility] () - - 12/29/2020  · Medications  o Prolia 60 mg/mL subcutaneous syringe   SIG: inject 1 milliliter (60 mg) by subcutaneous route every 6 months in the upper arm, upper thigh or abdomen   DISP: (1) 1 ml syringe with 3 refills  Discontinued on 12/29/2020     o Medications have been Reconciled  o Transition of Care or Provider Policy  · Instructions  o Patient was educated/instructed on their diagnosis, treatment and medications prior to discharge from the clinic today.  o Electronically Identified Patient Education Materials Provided Electronically  · Disposition  o Call or Return if symptoms worsen or persist.  o Care Transition            Electronically Signed by: Luis Carlos Rincon MD -Author on " December 29, 2020 04:16:56 PM

## 2021-05-15 VITALS
HEART RATE: 66 BPM | OXYGEN SATURATION: 99 % | TEMPERATURE: 98 F | BODY MASS INDEX: 24.42 KG/M2 | DIASTOLIC BLOOD PRESSURE: 72 MMHG | SYSTOLIC BLOOD PRESSURE: 138 MMHG | HEIGHT: 65 IN | WEIGHT: 146.56 LBS

## 2021-05-15 VITALS
HEART RATE: 58 BPM | DIASTOLIC BLOOD PRESSURE: 71 MMHG | WEIGHT: 144.19 LBS | HEIGHT: 65 IN | TEMPERATURE: 97.6 F | SYSTOLIC BLOOD PRESSURE: 116 MMHG | BODY MASS INDEX: 24.02 KG/M2 | OXYGEN SATURATION: 98 %

## 2021-05-15 VITALS
SYSTOLIC BLOOD PRESSURE: 136 MMHG | DIASTOLIC BLOOD PRESSURE: 71 MMHG | RESPIRATION RATE: 16 BRPM | HEART RATE: 70 BPM | WEIGHT: 144 LBS | HEIGHT: 65 IN | OXYGEN SATURATION: 98 % | BODY MASS INDEX: 23.99 KG/M2 | TEMPERATURE: 98.2 F

## 2021-05-15 VITALS
DIASTOLIC BLOOD PRESSURE: 80 MMHG | SYSTOLIC BLOOD PRESSURE: 146 MMHG | OXYGEN SATURATION: 98 % | TEMPERATURE: 97.7 F | HEIGHT: 65 IN | WEIGHT: 146.25 LBS | HEART RATE: 67 BPM | BODY MASS INDEX: 24.37 KG/M2 | RESPIRATION RATE: 12 BRPM

## 2021-05-15 VITALS — WEIGHT: 145 LBS | RESPIRATION RATE: 18 BRPM | BODY MASS INDEX: 24.16 KG/M2 | HEIGHT: 65 IN

## 2021-05-15 VITALS
WEIGHT: 144 LBS | BODY MASS INDEX: 23.99 KG/M2 | OXYGEN SATURATION: 98 % | SYSTOLIC BLOOD PRESSURE: 123 MMHG | DIASTOLIC BLOOD PRESSURE: 71 MMHG | HEART RATE: 66 BPM | TEMPERATURE: 97.3 F | HEIGHT: 65 IN

## 2021-05-15 VITALS — WEIGHT: 148 LBS | HEIGHT: 65 IN | BODY MASS INDEX: 24.66 KG/M2 | RESPIRATION RATE: 16 BRPM

## 2021-05-16 VITALS
OXYGEN SATURATION: 97 % | TEMPERATURE: 97.4 F | DIASTOLIC BLOOD PRESSURE: 77 MMHG | WEIGHT: 148.06 LBS | HEART RATE: 79 BPM | BODY MASS INDEX: 25.28 KG/M2 | HEIGHT: 64 IN | SYSTOLIC BLOOD PRESSURE: 124 MMHG

## 2021-05-16 VITALS
SYSTOLIC BLOOD PRESSURE: 130 MMHG | HEIGHT: 64 IN | WEIGHT: 145 LBS | BODY MASS INDEX: 24.75 KG/M2 | HEART RATE: 74 BPM | DIASTOLIC BLOOD PRESSURE: 98 MMHG

## 2021-05-16 VITALS
DIASTOLIC BLOOD PRESSURE: 70 MMHG | BODY MASS INDEX: 25.31 KG/M2 | TEMPERATURE: 98.1 F | RESPIRATION RATE: 17 BRPM | HEIGHT: 64 IN | HEART RATE: 69 BPM | SYSTOLIC BLOOD PRESSURE: 127 MMHG | OXYGEN SATURATION: 98 % | WEIGHT: 148.25 LBS

## 2021-05-26 ENCOUNTER — OFFICE VISIT CONVERTED (OUTPATIENT)
Dept: GASTROENTEROLOGY | Facility: CLINIC | Age: 56
End: 2021-05-26
Attending: NURSE PRACTITIONER

## 2021-06-05 NOTE — H&P
History and Physical      Patient Name: Payal Eldridge   Patient ID: [id number]   Sex: Female   YOB: 1965    Primary Care Provider: Luis Carlos Rincon MD   Referring Provider: Luis Carlos Rincon MD    Visit Date: May 26, 2021    Provider: HERNAN Bansal   Location: [facility]   Location Address: [address] [place] [address]  [place]  [id number]   Location Phone: [phone]          Chief Complaint  · Rectal bleeding      History Of Present Illness  The patient is a 56 year old /White female, who presents on referral from Luis Carlos Rincon MD, for a gastroenterology evaluation for rectal bleeding and constipation. The bleeding has been occasional and is with wiping.   She reports the bright red blood per rectum and constipation began approximately 6 months ago and now the symptom is better with no treatment.   The patient has no past history of any GI diseases.      She started having occasional rectal bleeding approximately 6 months ago.  It is bright red and occurs with wiping.  She reports she feels a little bit more constipated, having a bowel movement every other day.  She reports the number of stools depends on her food intake.  Her father was diagnosed with colon cancer at the age of 53 and passed at 54.  She does have right upper quadrant pain that started last year.  It is intermittent, and when it does occur, it would last for a day.  She describes it as a dull burn.  Nothing alleviates the pain, but leaning on a table tends to aggravate it.  At its worst, she rates it a 4 out of 10 on the pain scale.  She has had a cholecystectomy.  She denies nausea, vomiting, melena, hematemesis, and reflux.  She has been tried on Prilosec before, but the patient states it makes her stomach upset.  The patient states she tries not to take medications if she does not have to.  She does not use NSAIDs and does not smoke.  Her last colonoscopy was in 2017 at [facility].  She  "had no polyps removed.  She has been purposely trying to lose weight and has lost 10 lbs.    CT abdomen/pelvis with contrast 2021 revealed hepatic steatosis.       Past Medical History  Abnormal PAP Smear; Anxiety; Cardiac Dysrhythmia; Chemotherapy/Cancer; Costochondritis; Endometriosis of uterus; Hypothyroidism; Irritable Bowel Syndrome; Melenoma; Migraine Headaches; Screening Mammogram         Past Surgical History  Cholecystectomy; Colonoscopy; Dilation and curretage; Hysterectomy; Lasik; Tubal ligation         Medication List  Calcium 600 + D(3) 600 mg calcium- 200 unit oral capsule; Synthroid 137 mcg oral tablet         Allergy List  NO KNOWN DRUG ALLERGIES; Seasonal       Allergies Reconciled  Family Medical History  Stroke; Colon Cancer         Reproductive History   4 Para 0 0 0 2 & Perimenopausal       Social History  Alcohol; Financial; ; Sedentary; Tobacco (Never)         Immunizations  Name Date Admin   Hepatitis A 2018   Hepatitis A 2018   Hepatitis A 2018   Hepatitis A 2018   Influenza 2019   Influenza Refused   Influenza Refused   Prevnar 13 2019   Prevnar 13 2019   Tdap 03/10/2014         Review of Systems  · Constitutional  o Denies  o : chills, fever  · Eyes  o Denies  o : blurred vision, changes in vision  · Cardiovascular  o Denies  o : chest pain  · Respiratory  o Denies  o : shortness of breath  · Gastrointestinal  o Denies  o : nausea, vomiting  · Genitourinary  o Denies  o : dysuria, blood in urine  · Integument  o Denies  o : rash  · Neurologic  o Denies  o : tingling or numbness  · Musculoskeletal  o Denies  o : joint pain  · Endocrine  o Denies  o : weight gain, weight loss  · Psychiatric  o Denies  o : anxiety, depression      Vitals  Date Time BP Position Site L\R Cuff Size HR RR TEMP (F) WT  HT  BMI kg/m2 BSA m2 O2 Sat FR L/min FiO2        2021 08:10 /76 Sitting    76 - R 16  136lbs 0oz 5'  4\" 23.34 1.67 99 %  "           Physical Examination  · Constitutional  o Appearance  o : Well-nourished, well developed, alert, in no acute distress, alert and oriented X 3.  · Head and Face  o Head  o : Head is atraumatic. Eyes are anicteric. Oropharynx is clear. Mucus membranes are moist. Oropharynx is clear. Mucous membranes are moist.  · Neck  o Inspection/Palpation  o : Neck is supple without palpable lymphadenopathy.  · Respiratory  o Auscultation of Lungs  o : Chest is clear to auscultation bilaterally.  · Cardiovascular  o Heart  o : Cardiovascular exam reveals a regular rate and rhythm.  · Gastrointestinal  o Abdominal Examination  o : Abdomen is soft, nontender to palpation, with normal active bowel sounds, no appreciable hepatosplenomegaly.  · Genitourinary  o Digital Rectal Examination  o : Deferred  · Skin and Subcutaneous Tissue  o General Inspection  o : Skin is without focal lesions  o Extremities  o : There is no clubbing, cyanosis or edema.  · Neurologic  o Mental Status Examination  o : No focal deficits noted.  · Psychiatric  o Mood and Affect  o : No obvious depression.          Assessment  · Altered Bowel Habits     787.99/R19.4  · Rectal bleeding     569.3/K62.5  · Family History of Colorectal Cancer     V16.0/Z80.0  Father  · RUQ abdominal pain     789.01/R10.11      Plan  · Orders  o Consent for Esophagogastroduodenoscopy (EGD) - Possible risks/complications, benefits, and alternatives to surgical or invasive procedure have been explained to patient and/or legal guardian. - Patient has been evaluated and can tolerate anesthesia and/or sedation. Risks, benefits, and alternatives to anesthesia and sedation have been explained to patient and/or legal guardian. ([id number]) - 789.01/R10.11 - 05/26/2021  o Flexible Colonoscopy -Possible risks/complications, benefits, and alternatives to surgical or invasive procedure have been explained to patient and/or legal gaurdian. -Patient has been evaluated and can tolerate  anethesia and/or sedation. Risk, benefits, and alternatives to anethesia and/or sedation have been explained to patient and/or legal gaurdian. ([id number]) - V16.0/Z80.0, 569.3/K62.5, 787.99/R19.4 - 05/26/2021  · Medications  o Medications have been Reconciled  o Transition of Care or Provider Policy  · Instructions  o 56-year-old female presents today for the evaluation of rectal bleeding, constipation, and right upper quadrant pain. She does have family history of colon cancer in her father, who was diagnosed at 53 and passed at age of 54. I have recommended that she take an over-the-counter fiber supplement to see if that will help improve her bowel movements. I have also recommended undergoing EGD/colonoscopy to further evaluate her symptoms. We have discussed the procedures, and the patient is agreeable to proceed with both procedures.  o Electronically Identified Patient Education Materials Provided Electronically            Electronically Signed by: HERNAN Bansal -Author on May 26, 2021 08:34:12 AM  Electronically Co-signed by: Rico Conner MD -Reviewer on June 4, 2021 03:40:12 PM

## 2021-07-15 VITALS
WEIGHT: 136 LBS | SYSTOLIC BLOOD PRESSURE: 116 MMHG | RESPIRATION RATE: 16 BRPM | OXYGEN SATURATION: 99 % | DIASTOLIC BLOOD PRESSURE: 76 MMHG | HEART RATE: 76 BPM | HEIGHT: 64 IN | BODY MASS INDEX: 23.22 KG/M2

## 2021-07-20 ENCOUNTER — LAB (OUTPATIENT)
Dept: LAB | Facility: HOSPITAL | Age: 56
End: 2021-07-20

## 2021-07-20 ENCOUNTER — TRANSCRIBE ORDERS (OUTPATIENT)
Dept: FAMILY MEDICINE CLINIC | Facility: CLINIC | Age: 56
End: 2021-07-20

## 2021-07-20 DIAGNOSIS — Z01.89 RADIOLOGICAL EXAMINATION, NOT ELSEWHERE CLASSIFIED: ICD-10-CM

## 2021-07-20 DIAGNOSIS — E55.9 VITAMIN D DEFICIENCY DISEASE: ICD-10-CM

## 2021-07-20 DIAGNOSIS — E03.9 HYPOTHYROIDISM, ADULT: ICD-10-CM

## 2021-07-20 DIAGNOSIS — Z13.6 SCREENING FOR ISCHEMIC HEART DISEASE: ICD-10-CM

## 2021-07-20 DIAGNOSIS — R10.9 ABDOMINAL PAIN, UNSPECIFIED ABDOMINAL LOCATION: Primary | ICD-10-CM

## 2021-07-20 DIAGNOSIS — R10.9 ABDOMINAL PAIN, UNSPECIFIED ABDOMINAL LOCATION: ICD-10-CM

## 2021-07-20 LAB
25(OH)D3 SERPL-MCNC: 38.4 NG/ML (ref 30–100)
ALBUMIN SERPL-MCNC: 4.8 G/DL (ref 3.5–5.2)
ALBUMIN UR-MCNC: <1.2 MG/DL
ALBUMIN/GLOB SERPL: 1.9 G/DL
ALP SERPL-CCNC: 55 U/L (ref 39–117)
ALT SERPL W P-5'-P-CCNC: 22 U/L (ref 1–33)
ANION GAP SERPL CALCULATED.3IONS-SCNC: 9.3 MMOL/L (ref 5–15)
AST SERPL-CCNC: 24 U/L (ref 1–32)
BACTERIA UR QL AUTO: ABNORMAL /HPF
BASOPHILS # BLD AUTO: 0.02 10*3/MM3 (ref 0–0.2)
BASOPHILS NFR BLD AUTO: 0.4 % (ref 0–1.5)
BILIRUB SERPL-MCNC: 0.5 MG/DL (ref 0–1.2)
BILIRUB UR QL STRIP: NEGATIVE
BUN SERPL-MCNC: 17 MG/DL (ref 6–20)
BUN/CREAT SERPL: 37 (ref 7–25)
CALCIUM SPEC-SCNC: 9.8 MG/DL (ref 8.6–10.5)
CHLORIDE SERPL-SCNC: 104 MMOL/L (ref 98–107)
CHOLEST SERPL-MCNC: 208 MG/DL (ref 0–200)
CLARITY UR: CLEAR
CO2 SERPL-SCNC: 27.7 MMOL/L (ref 22–29)
COLOR UR: YELLOW
CREAT SERPL-MCNC: 0.46 MG/DL (ref 0.57–1)
DEPRECATED RDW RBC AUTO: 44.8 FL (ref 37–54)
EOSINOPHIL # BLD AUTO: 0.12 10*3/MM3 (ref 0–0.4)
EOSINOPHIL NFR BLD AUTO: 2.3 % (ref 0.3–6.2)
ERYTHROCYTE [DISTWIDTH] IN BLOOD BY AUTOMATED COUNT: 13.9 % (ref 12.3–15.4)
GFR SERPL CREATININE-BSD FRML MDRD: 140 ML/MIN/1.73
GLOBULIN UR ELPH-MCNC: 2.5 GM/DL
GLUCOSE SERPL-MCNC: 86 MG/DL (ref 65–99)
GLUCOSE UR STRIP-MCNC: NEGATIVE MG/DL
HCT VFR BLD AUTO: 43.4 % (ref 34–46.6)
HDLC SERPL-MCNC: 77 MG/DL (ref 40–60)
HGB BLD-MCNC: 14.1 G/DL (ref 12–15.9)
HGB UR QL STRIP.AUTO: NEGATIVE
HYALINE CASTS UR QL AUTO: ABNORMAL /LPF
IMM GRANULOCYTES # BLD AUTO: 0.01 10*3/MM3 (ref 0–0.05)
IMM GRANULOCYTES NFR BLD AUTO: 0.2 % (ref 0–0.5)
KETONES UR QL STRIP: NEGATIVE
LDLC SERPL CALC-MCNC: 115 MG/DL (ref 0–100)
LDLC/HDLC SERPL: 1.46 {RATIO}
LEUKOCYTE ESTERASE UR QL STRIP.AUTO: ABNORMAL
LYMPHOCYTES # BLD AUTO: 1.39 10*3/MM3 (ref 0.7–3.1)
LYMPHOCYTES NFR BLD AUTO: 26.6 % (ref 19.6–45.3)
MCH RBC QN AUTO: 28.7 PG (ref 26.6–33)
MCHC RBC AUTO-ENTMCNC: 32.5 G/DL (ref 31.5–35.7)
MCV RBC AUTO: 88.2 FL (ref 79–97)
MONOCYTES # BLD AUTO: 0.45 10*3/MM3 (ref 0.1–0.9)
MONOCYTES NFR BLD AUTO: 8.6 % (ref 5–12)
NEUTROPHILS NFR BLD AUTO: 3.23 10*3/MM3 (ref 1.7–7)
NEUTROPHILS NFR BLD AUTO: 61.9 % (ref 42.7–76)
NITRITE UR QL STRIP: NEGATIVE
NRBC BLD AUTO-RTO: 0 /100 WBC (ref 0–0.2)
PH UR STRIP.AUTO: 6 [PH] (ref 5–8)
PLATELET # BLD AUTO: 185 10*3/MM3 (ref 140–450)
PMV BLD AUTO: 11.1 FL (ref 6–12)
POTASSIUM SERPL-SCNC: 4.2 MMOL/L (ref 3.5–5.2)
PROT SERPL-MCNC: 7.3 G/DL (ref 6–8.5)
PROT UR QL STRIP: NEGATIVE
RBC # BLD AUTO: 4.92 10*6/MM3 (ref 3.77–5.28)
RBC # UR: ABNORMAL /HPF
REF LAB TEST METHOD: ABNORMAL
SODIUM SERPL-SCNC: 141 MMOL/L (ref 136–145)
SP GR UR STRIP: 1.02 (ref 1–1.03)
SQUAMOUS #/AREA URNS HPF: ABNORMAL /HPF
T4 FREE SERPL-MCNC: 1.68 NG/DL (ref 0.93–1.7)
TRIGL SERPL-MCNC: 93 MG/DL (ref 0–150)
TSH SERPL DL<=0.05 MIU/L-ACNC: 0.26 UIU/ML (ref 0.27–4.2)
UROBILINOGEN UR QL STRIP: ABNORMAL
VLDLC SERPL-MCNC: 16 MG/DL (ref 5–40)
WBC # BLD AUTO: 5.22 10*3/MM3 (ref 3.4–10.8)
WBC UR QL AUTO: ABNORMAL /HPF

## 2021-07-20 PROCEDURE — 82306 VITAMIN D 25 HYDROXY: CPT

## 2021-07-20 PROCEDURE — 85025 COMPLETE CBC W/AUTO DIFF WBC: CPT

## 2021-07-20 PROCEDURE — 81001 URINALYSIS AUTO W/SCOPE: CPT

## 2021-07-20 PROCEDURE — 80061 LIPID PANEL: CPT

## 2021-07-20 PROCEDURE — 84439 ASSAY OF FREE THYROXINE: CPT

## 2021-07-20 PROCEDURE — 36415 COLL VENOUS BLD VENIPUNCTURE: CPT

## 2021-07-20 PROCEDURE — 84443 ASSAY THYROID STIM HORMONE: CPT

## 2021-07-20 PROCEDURE — 82043 UR ALBUMIN QUANTITATIVE: CPT

## 2021-07-20 PROCEDURE — 80053 COMPREHEN METABOLIC PANEL: CPT

## 2021-07-21 RX ORDER — LEVOTHYROXINE SODIUM 137 MCG
137 TABLET ORAL DAILY
COMMUNITY
Start: 2021-05-15 | End: 2021-09-13

## 2021-07-21 RX ORDER — DIPHENOXYLATE HYDROCHLORIDE AND ATROPINE SULFATE 2.5; .025 MG/1; MG/1
TABLET ORAL DAILY
COMMUNITY
End: 2022-03-31

## 2021-07-21 RX ORDER — CETIRIZINE HYDROCHLORIDE 10 MG/1
10 TABLET ORAL DAILY
COMMUNITY
End: 2021-11-18

## 2021-07-21 RX ORDER — ALUMINUM ZIRCONIUM OCTACHLOROHYDREX GLY 16 G/100G
GEL TOPICAL
COMMUNITY
End: 2023-02-15

## 2021-07-21 NOTE — PRE-PROCEDURE INSTRUCTIONS
Pt. Instructed on laxative and skin prep, pre-op meds, clear liquid diet. Ok to take Zyrtec, Synthroid a.m. of procedure.

## 2021-07-26 ENCOUNTER — ANESTHESIA (OUTPATIENT)
Dept: GASTROENTEROLOGY | Facility: HOSPITAL | Age: 56
End: 2021-07-26

## 2021-07-26 ENCOUNTER — ANESTHESIA EVENT (OUTPATIENT)
Dept: GASTROENTEROLOGY | Facility: HOSPITAL | Age: 56
End: 2021-07-26

## 2021-07-26 ENCOUNTER — HOSPITAL ENCOUNTER (OUTPATIENT)
Facility: HOSPITAL | Age: 56
Setting detail: HOSPITAL OUTPATIENT SURGERY
Discharge: HOME | End: 2021-07-26
Attending: INTERNAL MEDICINE | Admitting: INTERNAL MEDICINE

## 2021-07-26 VITALS
SYSTOLIC BLOOD PRESSURE: 110 MMHG | OXYGEN SATURATION: 98 % | BODY MASS INDEX: 22.36 KG/M2 | TEMPERATURE: 97.7 F | DIASTOLIC BLOOD PRESSURE: 92 MMHG | WEIGHT: 130.95 LBS | HEART RATE: 71 BPM | HEIGHT: 64 IN | RESPIRATION RATE: 12 BRPM

## 2021-07-26 PROCEDURE — 25010000002 PROPOFOL 10 MG/ML EMULSION: Performed by: ANESTHESIOLOGY

## 2021-07-26 PROCEDURE — 43235 EGD DIAGNOSTIC BRUSH WASH: CPT | Performed by: INTERNAL MEDICINE

## 2021-07-26 PROCEDURE — 45378 DIAGNOSTIC COLONOSCOPY: CPT | Performed by: INTERNAL MEDICINE

## 2021-07-26 RX ORDER — PROPOFOL 10 MG/ML
VIAL (ML) INTRAVENOUS AS NEEDED
Status: DISCONTINUED | OUTPATIENT
Start: 2021-07-26 | End: 2021-07-26 | Stop reason: SURG

## 2021-07-26 RX ORDER — LIDOCAINE HYDROCHLORIDE 20 MG/ML
INJECTION, SOLUTION INFILTRATION; PERINEURAL AS NEEDED
Status: DISCONTINUED | OUTPATIENT
Start: 2021-07-26 | End: 2021-07-26 | Stop reason: SURG

## 2021-07-26 RX ORDER — SODIUM CHLORIDE, SODIUM LACTATE, POTASSIUM CHLORIDE, CALCIUM CHLORIDE 600; 310; 30; 20 MG/100ML; MG/100ML; MG/100ML; MG/100ML
30 INJECTION, SOLUTION INTRAVENOUS CONTINUOUS
Status: DISCONTINUED | OUTPATIENT
Start: 2021-07-26 | End: 2021-07-26 | Stop reason: HOSPADM

## 2021-07-26 RX ADMIN — PROPOFOL 200 MCG/KG/MIN: 10 INJECTION, EMULSION INTRAVENOUS at 13:56

## 2021-07-26 RX ADMIN — PROPOFOL 150 MG: 10 INJECTION, EMULSION INTRAVENOUS at 13:56

## 2021-07-26 RX ADMIN — SODIUM CHLORIDE, POTASSIUM CHLORIDE, SODIUM LACTATE AND CALCIUM CHLORIDE: 600; 310; 30; 20 INJECTION, SOLUTION INTRAVENOUS at 13:46

## 2021-07-26 RX ADMIN — LIDOCAINE HYDROCHLORIDE 50 MG: 20 INJECTION, SOLUTION INFILTRATION; PERINEURAL at 13:56

## 2021-07-26 NOTE — H&P
Pre Procedure History & Physical    Chief Complaint:   Rectal bleeding  gerd  Family h/o colon cancer    Subjective     HPI:   As above    Past Medical History:   Past Medical History:   Diagnosis Date   • Abnormal vaginal Pap smear     dr. marroquin   • Allergies    • Anxiety    • Cardiac dysrhythmia     sees Dr. Villalpando   • Costochondritis     sees Dr. Villalpando   • Endometriosis of uterus    • History of cancer chemotherapy     melanoma on abdomen excised. see dermatologist yearly    • Hx of migraine headaches    • Hypothyroidism    • Irritable bowel syndrome     sees GI doctor   • Melanoma (CMS/HCC)    • Screening mammogram, encounter for 04/2019       Past Surgical History:  Past Surgical History:   Procedure Laterality Date   • CHOLECYSTECTOMY     • COLONOSCOPY  2017   • DILATATION AND CURETTAGE     • HYSTERECTOMY     • LASIK     • TUBAL ABDOMINAL LIGATION         Family History:  Family History   Problem Relation Age of Onset   • Colon cancer Father 53        Dad diadnosed at 53   • Malig Hyperthermia Neg Hx        Social History:   reports that she has never smoked. She does not have any smokeless tobacco history on file. She reports current alcohol use. She reports that she does not use drugs.    Medications:   Medications Prior to Admission   Medication Sig Dispense Refill Last Dose   • Calcium Carbonate-Vitamin D 600-200 MG-UNIT capsule Calcium 600 + D(3) 600 mg calcium- 200 unit oral capsule take 1 capsule by oral route daily   Active   7/25/2021 at Unknown time   • cetirizine (zyrTEC) 10 MG tablet Take 10 mg by mouth Daily.   7/25/2021 at Unknown time   • multivitamin (multivitamin) tablet tablet Take  by mouth Daily.   7/25/2021 at Unknown time   • Probiotic Product (Align) capsule Align 4 mg oral capsule take 1 capsule by oral route daily   Suspended   7/25/2021 at Unknown time   • Synthroid 137 MCG tablet Take 137 mcg by mouth Daily.   7/25/2021 at Unknown time       Allergies:  Patient has no known  "allergies.        Objective     Blood pressure 119/79, pulse 67, temperature 98.6 °F (37 °C), resp. rate 20, height 162.7 cm (64.06\"), weight 59.4 kg (130 lb 15.3 oz), SpO2 96 %.    Physical Exam   Constitutional: Pt is oriented to person, place, and time and well-developed, well-nourished, and in no distress.   Mouth/Throat: Oropharynx is clear and moist.   Neck: Normal range of motion.   Cardiovascular: Normal rate, regular rhythm and normal heart sounds.    Pulmonary/Chest: Effort normal and breath sounds normal.   Abdominal: Soft. Nontender  Skin: Skin is warm and dry.   Psychiatric: Mood, memory, affect and judgment normal.     Assessment/Plan     Diagnosis:  Rectal bleeding  gerd  Family h/o colon cancer      Anticipated Surgical Procedure:  egd  colonoscopy    The risks, benefits, and alternatives of this procedure have been discussed with the patient or the responsible party- the patient understands and agrees to proceed.            "

## 2021-07-26 NOTE — ANESTHESIA POSTPROCEDURE EVALUATION
Patient: Payal Eldridge    Procedure Summary     Date: 07/26/21 Room / Location: [facility] ENDOSCOPY 2 / [facility]    Anesthesia Start: 1352 Anesthesia Stop: 1421    Procedures:       ESOPHAGOGASTRODUODENOSCOPY (N/A )      COLONOSCOPY (N/A ) Diagnosis: (FAM HX OF COLON CA,ABDOMINAL PAIN)    Surgeons: Rico Conner MD Provider: Sánchez Coburn MD    Anesthesia Type: general ASA Status: 1          Anesthesia Type: general    Vitals  Vitals Value Taken Time   /68 07/26/21 1428   Temp 36.2 °C (97.2 °F) 07/26/21 1418   Pulse 74 07/26/21 1428   Resp 18 07/26/21 1428   SpO2 99 % 07/26/21 1428           Post Anesthesia Care and Evaluation    Patient location during evaluation: bedside  Patient participation: complete - patient participated  Level of consciousness: awake  Pain management: adequate  Airway patency: patent  Anesthetic complications: No anesthetic complications  PONV Status: none  Cardiovascular status: acceptable and stable  Respiratory status: acceptable  Hydration status: acceptable    Comments: An Anesthesiologist personally participated in the most demanding procedures (including induction and emergence if applicable) in the anesthesia plan, monitored the course of anesthesia administration at frequent intervals and remained physically present and available for immediate diagnosis and treatment of emergencies.

## 2021-07-26 NOTE — ANESTHESIA PREPROCEDURE EVALUATION
Anesthesia Evaluation     Patient summary reviewed and Nursing notes reviewed   no history of anesthetic complications:  NPO Solid Status: > 8 hours  NPO Liquid Status: > 2 hours           Airway   Mallampati: I  TM distance: >3 FB  Neck ROM: full  No difficulty expected  Dental      Pulmonary - negative pulmonary ROS and normal exam    breath sounds clear to auscultation  Cardiovascular - negative cardio ROS and normal exam  Exercise tolerance: good (4-7 METS)    Rhythm: regular        Neuro/Psych- negative ROS  GI/Hepatic/Renal/Endo - negative ROS     Musculoskeletal (-) negative ROS    Abdominal    Substance History - negative use     OB/GYN negative ob/gyn ROS         Other - negative ROS                       Anesthesia Plan    ASA 1     general   (Total IV Anesthesia    Patient understands anesthesia not responsible for dental damage.  )  intravenous induction     Anesthetic plan, all risks, benefits, and alternatives have been provided, discussed and informed consent has been obtained with: patient.    Plan discussed with CRNA.

## 2021-08-05 ENCOUNTER — TELEPHONE (OUTPATIENT)
Dept: FAMILY MEDICINE CLINIC | Facility: CLINIC | Age: 56
End: 2021-08-05

## 2021-08-05 NOTE — TELEPHONE ENCOUNTER
Caller: Payal Eldridge    Relationship: Self    Best call back number: [phone]     What is the best time to reach you: ANY    Who are you requesting to speak with (clinical staff, provider,  specific staff member): CLINICAL     What was the call regarding: PATIENT STATES HER  TESTED POSITIVE FOR COVID. PATIENT STATES SHE HAS AN APPOINTMENT TOMORROW TO GET TESTED BUT HAS AN APPOINTMENT WITH DR. KATHLEEN ON Tuesday 8/10/21. PATIENT STATES SHE WOULD LIKE TO KNOW IF SHE NEEDS TO RESCHEDULE HER APPOINTMENT     Do you require a callback:  YES

## 2021-08-05 NOTE — TELEPHONE ENCOUNTER
Patient is aware that she needs to quarantine with her  even if her COVID test is negative. Cancelled appt.

## 2021-08-24 RX ORDER — LEVOTHYROXINE SODIUM 137 UG/1
137 TABLET ORAL DAILY
Qty: 30 TABLET | Refills: 6 | Status: SHIPPED | OUTPATIENT
Start: 2021-08-24 | End: 2022-02-14 | Stop reason: SDUPTHER

## 2021-09-13 ENCOUNTER — HOSPITAL ENCOUNTER (OUTPATIENT)
Dept: GENERAL RADIOLOGY | Facility: HOSPITAL | Age: 56
Discharge: HOME | End: 2021-09-13
Admitting: FAMILY MEDICINE

## 2021-09-13 ENCOUNTER — OFFICE VISIT (OUTPATIENT)
Dept: FAMILY MEDICINE CLINIC | Facility: CLINIC | Age: 56
End: 2021-09-13

## 2021-09-13 VITALS
OXYGEN SATURATION: 98 % | DIASTOLIC BLOOD PRESSURE: 79 MMHG | TEMPERATURE: 97.3 F | RESPIRATION RATE: 17 BRPM | WEIGHT: 140 LBS | BODY MASS INDEX: 23.9 KG/M2 | SYSTOLIC BLOOD PRESSURE: 134 MMHG | HEART RATE: 62 BPM | HEIGHT: 64 IN

## 2021-09-13 DIAGNOSIS — R07.89 CHEST WALL PAIN: ICD-10-CM

## 2021-09-13 DIAGNOSIS — E55.9 VITAMIN D DEFICIENCY: ICD-10-CM

## 2021-09-13 DIAGNOSIS — E03.9 HYPOTHYROIDISM, ACQUIRED: ICD-10-CM

## 2021-09-13 DIAGNOSIS — J30.9 ALLERGIC RHINITIS, UNSPECIFIED SEASONALITY, UNSPECIFIED TRIGGER: ICD-10-CM

## 2021-09-13 DIAGNOSIS — E78.2 MIXED HYPERLIPIDEMIA: ICD-10-CM

## 2021-09-13 DIAGNOSIS — M81.0 OSTEOPOROSIS, UNSPECIFIED OSTEOPOROSIS TYPE, UNSPECIFIED PATHOLOGICAL FRACTURE PRESENCE: ICD-10-CM

## 2021-09-13 DIAGNOSIS — Z00.00 WELLNESS EXAMINATION: ICD-10-CM

## 2021-09-13 DIAGNOSIS — R53.83 FATIGUE, UNSPECIFIED TYPE: ICD-10-CM

## 2021-09-13 DIAGNOSIS — K76.0 FATTY LIVER: ICD-10-CM

## 2021-09-13 PROBLEM — F41.9 ANXIETY: Status: ACTIVE | Noted: 2021-09-13

## 2021-09-13 PROBLEM — G43.909 MIGRAINE: Status: ACTIVE | Noted: 2021-09-13

## 2021-09-13 PROBLEM — I49.9 CARDIAC DYSRHYTHMIA: Status: ACTIVE | Noted: 2021-09-13

## 2021-09-13 PROBLEM — N80.00 UTERINE ENDOMETRIOSIS: Status: ACTIVE | Noted: 2021-09-13

## 2021-09-13 PROBLEM — M94.0 COSTOCHONDRITIS: Status: ACTIVE | Noted: 2021-09-13

## 2021-09-13 PROBLEM — K62.5 RECTAL BLEEDING: Status: ACTIVE | Noted: 2017-03-28

## 2021-09-13 PROCEDURE — 71046 X-RAY EXAM CHEST 2 VIEWS: CPT

## 2021-09-13 PROCEDURE — 99396 PREV VISIT EST AGE 40-64: CPT | Performed by: FAMILY MEDICINE

## 2021-09-13 NOTE — PROGRESS NOTES
"  Chief Complaint   Patient presents with   • Hypothyroidism     physical     Subjective   Payal Eldridge is a 56 y.o. female who presents to the office for CPX.    The following portions of the patient's history were reviewed and updated as appropriate: allergies, current medications, past family history, past medical history, past social history, past surgical history and problem list.    Review of Systems   Constitutional: Negative for chills, fever and unexpected weight loss.   Respiratory: Negative for cough, chest tightness and shortness of breath.    Cardiovascular: Negative for chest pain and palpitations.   Gastrointestinal: Negative for abdominal pain, constipation, diarrhea, nausea and vomiting.        Objective   Vitals:    09/13/21 0810   BP: 134/79   Pulse: 62   Resp: 17   Temp: 97.3 °F (36.3 °C)   SpO2: 98%   Weight: 63.5 kg (140 lb)   Height: 162.6 cm (64\")     Body mass index is 24.03 kg/m².  Physical Exam  Vitals reviewed.   Constitutional:       General: She is not in acute distress.     Appearance: Normal appearance. She is not ill-appearing.   HENT:      Head: Normocephalic.   Eyes:      Conjunctiva/sclera: Conjunctivae normal.   Cardiovascular:      Rate and Rhythm: Normal rate and regular rhythm.   Pulmonary:      Effort: Pulmonary effort is normal.      Breath sounds: Normal breath sounds.   Skin:     Findings: No lesion or rash.   Neurological:      Mental Status: She is alert and oriented to person, place, and time.   Psychiatric:         Mood and Affect: Mood normal.          Assessment/Plan   Diagnoses and all orders for this visit:    1. Wellness examination    2. Mixed hyperlipidemia    3. Osteoporosis, unspecified osteoporosis type, unspecified pathological fracture presence  Comments:  recheck BDS in August of 2022. she declines the prolia due to concerns of potential side effects.    4. Hypothyroidism, acquired  Comments:  controlled. monitor.  Orders:  -     CBC Auto " Differential; Future  -     Comprehensive Metabolic Panel; Future  -     Lipid Panel; Future  -     TSH; Future  -     Urinalysis With Microscopic If Indicated (No Culture) - Urine, Clean Catch; Future  -     T4, Free; Future  -     XR Chest PA & Lateral    5. Allergic rhinitis, unspecified seasonality, unspecified trigger    6. Fatty liver  Comments:  recheck next visit.  Orders:  -     US Liver; Future    7. Chest wall pain  Comments:  intermittent. she has a hx of costochondritis.  Orders:  -     XR Chest PA & Lateral    8. Vitamin D deficiency  -     Vitamin D 25 Hydroxy; Future    9. Fatigue, unspecified type  -     Vitamin B12 & Folate; Future             Low fat diet  Activity on regular basis.  Dentist regularly    Return in about 6 months (around 3/13/2022).   PHQ-2/PHQ-9 Depression Screening 9/13/2021   Little interest or pleasure in doing things 0   Feeling down, depressed, or hopeless 0   Total Score 0

## 2021-11-18 ENCOUNTER — OFFICE VISIT (OUTPATIENT)
Dept: FAMILY MEDICINE CLINIC | Facility: CLINIC | Age: 56
End: 2021-11-18

## 2021-11-18 VITALS
HEART RATE: 75 BPM | HEIGHT: 64 IN | RESPIRATION RATE: 16 BRPM | WEIGHT: 135.7 LBS | DIASTOLIC BLOOD PRESSURE: 82 MMHG | OXYGEN SATURATION: 96 % | TEMPERATURE: 98.4 F | SYSTOLIC BLOOD PRESSURE: 154 MMHG | BODY MASS INDEX: 23.17 KG/M2

## 2021-11-18 DIAGNOSIS — T14.8XXA MUSCLE STRAIN: Primary | ICD-10-CM

## 2021-11-18 PROCEDURE — 99212 OFFICE O/P EST SF 10 MIN: CPT | Performed by: NURSE PRACTITIONER

## 2021-11-18 RX ORDER — PHENOL 1.4 %
AEROSOL, SPRAY (ML) MUCOUS MEMBRANE
COMMUNITY
End: 2022-03-31

## 2021-11-18 RX ORDER — MULTIPLE VITAMINS W/ MINERALS TAB 9MG-400MCG
TAB ORAL
COMMUNITY

## 2021-11-18 NOTE — PROGRESS NOTES
Chief Complaint  Groin Pain (since Sept after lifting a bag of rock, concerned for hernia or torn muscle)    Ericka Eldridge presents to [facility]  Strained left groin at the end of September and is some better but still hurting when bends over.  Doesn't hurt all the time.  Has always been intermittent but not as often now as in September        Past History:    Medical History: has a past medical history of Abnormal vaginal Pap smear, Allergies, Anxiety, Cardiac dysrhythmia, Costochondritis, Endometriosis of uterus, History of cancer chemotherapy, migraine headaches, Hypothyroidism, Irritable bowel syndrome, Melanoma (HCC), and Screening mammogram, encounter for (04/2019).     Surgical History: has a past surgical history that includes Cholecystectomy; Colonoscopy (2017); Dilation and curettage of uterus; Hysterectomy; LASIK; Tubal ligation; Esophagogastroduodenoscopy (N/A, 7/26/2021); and Colonoscopy (N/A, 7/26/2021).     Family History: family history includes Colon cancer (age of onset: 53) in her father.     Social History: reports that she has never smoked. She has never used smokeless tobacco. She reports current alcohol use. She reports that she does not use drugs.    Allergies: Patient has no known allergies.          Current Outpatient Medications:   •  Calcium Carbonate-Vitamin D 600-200 MG-UNIT capsule, Calcium 600 + D(3) 600 mg calcium- 200 unit oral capsule take 1 capsule by oral route daily   Active, Disp: , Rfl:   •  levothyroxine (SYNTHROID, LEVOTHROID) 137 MCG tablet, Take 1 tablet by mouth Daily., Disp: 30 tablet, Rfl: 6  •  Melatonin 10 MG tablet, melatonin oral qhs   Suspended, Disp: , Rfl:   •  multivitamin (multivitamin) tablet tablet, Take  by mouth Daily., Disp: , Rfl:   •  multivitamin with minerals (MULTIVITAMIN ADULT PO), multivitamin Oral Tablet take 1 tablet by oral route once daily with food   Suspended, Disp: , Rfl:   •   "Probiotic Product (Align) capsule, Align 4 mg oral capsule take 1 capsule by oral route daily   Suspended, Disp: , Rfl:     Medications Discontinued During This Encounter   Medication Reason   • cetirizine (zyrTEC) 10 MG tablet *Therapy completed         Review of Systems   Constitutional: Negative for fever.   Respiratory: Negative for cough and shortness of breath.    Cardiovascular: Negative for chest pain, palpitations and leg swelling.   Neurological: Negative for dizziness, weakness, numbness and headache.        Objective         Vitals:    11/18/21 0911   BP: 154/82   BP Location: Left arm   Patient Position: Sitting   Cuff Size: Adult   Pulse: 75   Resp: 16   Temp: 98.4 °F (36.9 °C)   TempSrc: Infrared   SpO2: 96%   Weight: 61.6 kg (135 lb 11.2 oz)   Height: 162.6 cm (64\")     Body mass index is 23.29 kg/m².         Physical Exam  Vitals reviewed.   Constitutional:       Appearance: Normal appearance. She is well-developed.   HENT:      Head: Normocephalic and atraumatic.      Mouth/Throat:      Pharynx: No oropharyngeal exudate.   Cardiovascular:      Rate and Rhythm: Normal rate and regular rhythm.      Heart sounds: No murmur heard.  No friction rub. No gallop.    Pulmonary:      Effort: Pulmonary effort is normal.      Breath sounds: Normal breath sounds. No wheezing or rhonchi.   Abdominal:      General: Bowel sounds are normal. There is no distension.      Palpations: Abdomen is soft.      Tenderness: There is no abdominal tenderness.   Musculoskeletal:         General: Normal range of motion.      Cervical back: Normal range of motion.   Skin:     General: Skin is warm and dry.      Capillary Refill: Capillary refill takes less than 2 seconds.   Neurological:      Mental Status: She is alert and oriented to person, place, and time.      Cranial Nerves: No cranial nerve deficit.   Psychiatric:         Mood and Affect: Mood and affect normal.         Behavior: Behavior normal.         Thought Content: " Thought content normal.         Judgment: Judgment normal.             Result Review :               Assessment and Plan     Diagnoses and all orders for this visit:    1. Muscle strain (Primary)  Comments:  continue to limit lifting for the next few months.              Follow Up     Return if symptoms worsen or fail to improve.    Patient was given instructions and counseling regarding her condition or for health maintenance advice. Please see specific information pulled into the AVS if appropriate.

## 2022-02-14 NOTE — TELEPHONE ENCOUNTER
Caller: Payal Eldridge    Relationship: Self    Best call back number: [phone]    Requested Prescriptions:   Requested Prescriptions     Pending Prescriptions Disp Refills   • levothyroxine (SYNTHROID, LEVOTHROID) 137 MCG tablet 30 tablet 6     Sig: Take 1 tablet by mouth Daily.        Pharmacy where request should be sent: [id number] - [place], [place] - [address] [facility] AT [facility] OF [address] & [place] - [phone] [facility] [phone] FX         Does the patient have less than a 3 day supply:  [x] Yes  [] No    Sara FORD Rep   02/14/22 12:54 EST

## 2022-02-16 RX ORDER — LEVOTHYROXINE SODIUM 137 UG/1
137 TABLET ORAL DAILY
Qty: 30 TABLET | Refills: 6 | Status: SHIPPED | OUTPATIENT
Start: 2022-02-16 | End: 2022-09-16

## 2022-02-22 ENCOUNTER — APPOINTMENT (OUTPATIENT)
Dept: ULTRASOUND IMAGING | Facility: HOSPITAL | Age: 57
End: 2022-02-22

## 2022-02-23 ENCOUNTER — HOSPITAL ENCOUNTER (OUTPATIENT)
Dept: ULTRASOUND IMAGING | Facility: HOSPITAL | Age: 57
Discharge: HOME | End: 2022-02-23
Admitting: FAMILY MEDICINE

## 2022-02-23 DIAGNOSIS — K76.0 FATTY LIVER: ICD-10-CM

## 2022-02-23 PROCEDURE — 76705 ECHO EXAM OF ABDOMEN: CPT

## 2022-03-09 ENCOUNTER — LAB (OUTPATIENT)
Dept: LAB | Facility: HOSPITAL | Age: 57
End: 2022-03-09

## 2022-03-09 DIAGNOSIS — R53.83 FATIGUE, UNSPECIFIED TYPE: ICD-10-CM

## 2022-03-09 DIAGNOSIS — E55.9 VITAMIN D DEFICIENCY: ICD-10-CM

## 2022-03-09 DIAGNOSIS — E03.9 HYPOTHYROIDISM, ACQUIRED: ICD-10-CM

## 2022-03-09 LAB
25(OH)D3 SERPL-MCNC: 36.6 NG/ML (ref 30–100)
ALBUMIN SERPL-MCNC: 4.5 G/DL (ref 3.5–5.2)
ALBUMIN/GLOB SERPL: 1.7 G/DL
ALP SERPL-CCNC: 57 U/L (ref 39–117)
ALT SERPL W P-5'-P-CCNC: 21 U/L (ref 1–33)
ANION GAP SERPL CALCULATED.3IONS-SCNC: 7.7 MMOL/L (ref 5–15)
AST SERPL-CCNC: <5 U/L (ref 1–32)
BACTERIA UR QL AUTO: ABNORMAL /HPF
BASOPHILS # BLD AUTO: 0.02 10*3/MM3 (ref 0–0.2)
BASOPHILS NFR BLD AUTO: 0.4 % (ref 0–1.5)
BILIRUB SERPL-MCNC: 0.5 MG/DL (ref 0–1.2)
BILIRUB UR QL STRIP: NEGATIVE
BUN SERPL-MCNC: 18 MG/DL (ref 6–20)
BUN/CREAT SERPL: 18.6 (ref 7–25)
CALCIUM SPEC-SCNC: 9.7 MG/DL (ref 8.6–10.5)
CHLORIDE SERPL-SCNC: 104 MMOL/L (ref 98–107)
CHOLEST SERPL-MCNC: 211 MG/DL (ref 0–200)
CLARITY UR: CLEAR
CO2 SERPL-SCNC: 27.3 MMOL/L (ref 22–29)
COLOR UR: YELLOW
CREAT SERPL-MCNC: 0.97 MG/DL (ref 0.57–1)
DEPRECATED RDW RBC AUTO: 44 FL (ref 37–54)
EGFRCR SERPLBLD CKD-EPI 2021: 68.3 ML/MIN/1.73
EOSINOPHIL # BLD AUTO: 0.12 10*3/MM3 (ref 0–0.4)
EOSINOPHIL NFR BLD AUTO: 2.2 % (ref 0.3–6.2)
ERYTHROCYTE [DISTWIDTH] IN BLOOD BY AUTOMATED COUNT: 13.7 % (ref 12.3–15.4)
FOLATE SERPL-MCNC: 16.9 NG/ML (ref 4.78–24.2)
GLOBULIN UR ELPH-MCNC: 2.7 GM/DL
GLUCOSE SERPL-MCNC: 102 MG/DL (ref 65–99)
GLUCOSE UR STRIP-MCNC: NEGATIVE MG/DL
HCT VFR BLD AUTO: 43.4 % (ref 34–46.6)
HDLC SERPL-MCNC: 90 MG/DL (ref 40–60)
HGB BLD-MCNC: 14.6 G/DL (ref 12–15.9)
HGB UR QL STRIP.AUTO: NEGATIVE
HYALINE CASTS UR QL AUTO: ABNORMAL /LPF
IMM GRANULOCYTES # BLD AUTO: 0.01 10*3/MM3 (ref 0–0.05)
IMM GRANULOCYTES NFR BLD AUTO: 0.2 % (ref 0–0.5)
KETONES UR QL STRIP: NEGATIVE
LDLC SERPL CALC-MCNC: 111 MG/DL (ref 0–100)
LDLC/HDLC SERPL: 1.22 {RATIO}
LEUKOCYTE ESTERASE UR QL STRIP.AUTO: ABNORMAL
LYMPHOCYTES # BLD AUTO: 1.45 10*3/MM3 (ref 0.7–3.1)
LYMPHOCYTES NFR BLD AUTO: 26.8 % (ref 19.6–45.3)
MCH RBC QN AUTO: 29.4 PG (ref 26.6–33)
MCHC RBC AUTO-ENTMCNC: 33.6 G/DL (ref 31.5–35.7)
MCV RBC AUTO: 87.5 FL (ref 79–97)
MONOCYTES # BLD AUTO: 0.4 10*3/MM3 (ref 0.1–0.9)
MONOCYTES NFR BLD AUTO: 7.4 % (ref 5–12)
NEUTROPHILS NFR BLD AUTO: 3.42 10*3/MM3 (ref 1.7–7)
NEUTROPHILS NFR BLD AUTO: 63 % (ref 42.7–76)
NITRITE UR QL STRIP: NEGATIVE
NRBC BLD AUTO-RTO: 0 /100 WBC (ref 0–0.2)
PH UR STRIP.AUTO: 7 [PH] (ref 5–8)
PLATELET # BLD AUTO: 201 10*3/MM3 (ref 140–450)
PMV BLD AUTO: 11.2 FL (ref 6–12)
POTASSIUM SERPL-SCNC: 4.7 MMOL/L (ref 3.5–5.2)
PROT SERPL-MCNC: 7.2 G/DL (ref 6–8.5)
PROT UR QL STRIP: NEGATIVE
RBC # BLD AUTO: 4.96 10*6/MM3 (ref 3.77–5.28)
RBC # UR STRIP: ABNORMAL /HPF
REF LAB TEST METHOD: ABNORMAL
SODIUM SERPL-SCNC: 139 MMOL/L (ref 136–145)
SP GR UR STRIP: 1.02 (ref 1–1.03)
SQUAMOUS #/AREA URNS HPF: ABNORMAL /HPF
T4 FREE SERPL-MCNC: 1.76 NG/DL (ref 0.93–1.7)
TRIGL SERPL-MCNC: 56 MG/DL (ref 0–150)
TSH SERPL DL<=0.05 MIU/L-ACNC: 0.34 UIU/ML (ref 0.27–4.2)
UROBILINOGEN UR QL STRIP: ABNORMAL
VIT B12 BLD-MCNC: 589 PG/ML (ref 211–946)
VLDLC SERPL-MCNC: 10 MG/DL (ref 5–40)
WBC # UR STRIP: ABNORMAL /HPF
WBC NRBC COR # BLD: 5.42 10*3/MM3 (ref 3.4–10.8)

## 2022-03-09 PROCEDURE — 84439 ASSAY OF FREE THYROXINE: CPT

## 2022-03-09 PROCEDURE — 82607 VITAMIN B-12: CPT

## 2022-03-09 PROCEDURE — 84443 ASSAY THYROID STIM HORMONE: CPT

## 2022-03-09 PROCEDURE — 85025 COMPLETE CBC W/AUTO DIFF WBC: CPT

## 2022-03-09 PROCEDURE — 80053 COMPREHEN METABOLIC PANEL: CPT

## 2022-03-09 PROCEDURE — 80061 LIPID PANEL: CPT

## 2022-03-09 PROCEDURE — 81001 URINALYSIS AUTO W/SCOPE: CPT

## 2022-03-09 PROCEDURE — 36415 COLL VENOUS BLD VENIPUNCTURE: CPT

## 2022-03-09 PROCEDURE — 82306 VITAMIN D 25 HYDROXY: CPT

## 2022-03-09 PROCEDURE — 82746 ASSAY OF FOLIC ACID SERUM: CPT

## 2022-03-31 ENCOUNTER — OFFICE VISIT (OUTPATIENT)
Dept: FAMILY MEDICINE CLINIC | Facility: CLINIC | Age: 57
End: 2022-03-31

## 2022-03-31 VITALS
HEIGHT: 64 IN | SYSTOLIC BLOOD PRESSURE: 120 MMHG | DIASTOLIC BLOOD PRESSURE: 74 MMHG | HEART RATE: 62 BPM | BODY MASS INDEX: 24.52 KG/M2 | TEMPERATURE: 97.3 F | WEIGHT: 143.6 LBS | OXYGEN SATURATION: 97 % | RESPIRATION RATE: 18 BRPM

## 2022-03-31 DIAGNOSIS — K58.9 IRRITABLE BOWEL SYNDROME, UNSPECIFIED TYPE: ICD-10-CM

## 2022-03-31 DIAGNOSIS — E03.9 ACQUIRED HYPOTHYROIDISM: Primary | ICD-10-CM

## 2022-03-31 DIAGNOSIS — M81.8 OTHER OSTEOPOROSIS, UNSPECIFIED PATHOLOGICAL FRACTURE PRESENCE: ICD-10-CM

## 2022-03-31 PROCEDURE — 99214 OFFICE O/P EST MOD 30 MIN: CPT | Performed by: STUDENT IN AN ORGANIZED HEALTH CARE EDUCATION/TRAINING PROGRAM

## 2022-03-31 NOTE — PROGRESS NOTES
"Chief Complaint  Establishing care with new provider in the clinic for management of hypothyroid/osteoporosis/IBS    Subjective         Payal Eldridge is a 57 y.o. female who presents to [facility]  57 years old female with past medical history of hypothyroid/IBS/osteoporosis comes to the clinic today to follow-up on chronic conditions and establishing care with new provider.    Hypothyroidism; controlled TSH on current dose of levothyroxine.  Patient is compliant with medications.    IBS; controlled symptoms    Osteoporosis; patient does not want to do any bisphosphonate, wants to continue with vitamin D and calcium.    Denies any chest pain or shortness of breath on exertion, non-smoker.    Up-to-date with colonoscopy/EGD, pending mammogram and Pap smear at OB/GYN this year.  Recent blood work reviewed from last year    Review of Systems   Objective   Vital Signs:   Vitals:    03/31/22 0710   BP: 120/74   Pulse: 62   Resp: 18   Temp: 97.3 °F (36.3 °C)   SpO2: 97%   Weight: 65.1 kg (143 lb 9.6 oz)   Height: 162.6 cm (64\")      Body mass index is 24.65 kg/m².   Physical Exam  Vitals reviewed.   Constitutional:       Appearance: Normal appearance. She is well-developed.   HENT:      Head: Normocephalic and atraumatic.      Right Ear: External ear normal.      Left Ear: External ear normal.      Mouth/Throat:      Pharynx: No oropharyngeal exudate.   Eyes:      Conjunctiva/sclera: Conjunctivae normal.      Pupils: Pupils are equal, round, and reactive to light.   Cardiovascular:      Rate and Rhythm: Normal rate and regular rhythm.      Heart sounds: No murmur heard.    No friction rub. No gallop.   Pulmonary:      Effort: Pulmonary effort is normal.      Breath sounds: Normal breath sounds. No wheezing or rhonchi.   Abdominal:      General: Bowel sounds are normal. There is no distension.      Palpations: Abdomen is soft.      Tenderness: There is no abdominal tenderness. "   Skin:     General: Skin is warm and dry.   Neurological:      Mental Status: She is alert and oriented to person, place, and time.      Cranial Nerves: No cranial nerve deficit.   Psychiatric:         Mood and Affect: Mood and affect normal.         Behavior: Behavior normal.         Thought Content: Thought content normal.         Judgment: Judgment normal.                       Assessment and Plan   Diagnoses and all orders for this visit:    1. Acquired hypothyroidism (Primary)  -     TSH Rfx On Abnormal To Free T4; Future    2. Irritable bowel syndrome, unspecified type  -     TSH Rfx On Abnormal To Free T4; Future    3. Other osteoporosis, unspecified pathological fracture presence  Comments:  c/w vit d and ca and may consider dexa again   Orders:  -     TSH Rfx On Abnormal To Free T4; Future            Follow Up   Return in about 6 months (around 9/30/2022).  Patient was given instructions and counseling regarding her condition or for health maintenance advice. Please see specific information pulled into the AVS if appropriate.

## 2022-04-11 ENCOUNTER — APPOINTMENT (OUTPATIENT)
Dept: WOMENS IMAGING | Facility: HOSPITAL | Age: 57
End: 2022-04-11

## 2022-04-11 PROCEDURE — 77067 SCR MAMMO BI INCL CAD: CPT | Performed by: RADIOLOGY

## 2022-04-11 PROCEDURE — 77063 BREAST TOMOSYNTHESIS BI: CPT | Performed by: RADIOLOGY

## 2022-07-19 ENCOUNTER — TELEPHONE (OUTPATIENT)
Dept: FAMILY MEDICINE CLINIC | Facility: CLINIC | Age: 57
End: 2022-07-19

## 2022-09-16 ENCOUNTER — LAB (OUTPATIENT)
Dept: LAB | Facility: HOSPITAL | Age: 57
End: 2022-09-16

## 2022-09-16 DIAGNOSIS — K58.9 IRRITABLE BOWEL SYNDROME, UNSPECIFIED TYPE: ICD-10-CM

## 2022-09-16 DIAGNOSIS — M81.8 OTHER OSTEOPOROSIS, UNSPECIFIED PATHOLOGICAL FRACTURE PRESENCE: ICD-10-CM

## 2022-09-16 DIAGNOSIS — E03.9 ACQUIRED HYPOTHYROIDISM: ICD-10-CM

## 2022-09-16 PROCEDURE — 36415 COLL VENOUS BLD VENIPUNCTURE: CPT

## 2022-09-16 PROCEDURE — 84439 ASSAY OF FREE THYROXINE: CPT

## 2022-09-16 PROCEDURE — 84443 ASSAY THYROID STIM HORMONE: CPT

## 2022-09-16 RX ORDER — LEVOTHYROXINE SODIUM 137 UG/1
137 TABLET ORAL DAILY
Qty: 30 TABLET | Refills: 6 | Status: SHIPPED | OUTPATIENT
Start: 2022-09-16 | End: 2022-09-19

## 2022-09-17 LAB
T4 FREE SERPL-MCNC: 1.86 NG/DL (ref 0.93–1.7)
TSH SERPL DL<=0.05 MIU/L-ACNC: 0.17 UIU/ML (ref 0.27–4.2)

## 2022-09-19 DIAGNOSIS — E03.9 ACQUIRED HYPOTHYROIDISM: Primary | ICD-10-CM

## 2022-09-19 RX ORDER — LEVOTHYROXINE SODIUM 0.12 MG/1
125 TABLET ORAL DAILY
Qty: 90 TABLET | Refills: 0 | Status: SHIPPED | OUTPATIENT
Start: 2022-09-19 | End: 2022-12-19 | Stop reason: SDUPTHER

## 2022-12-16 ENCOUNTER — LAB (OUTPATIENT)
Dept: LAB | Facility: HOSPITAL | Age: 57
End: 2022-12-16

## 2022-12-16 ENCOUNTER — TELEPHONE (OUTPATIENT)
Dept: FAMILY MEDICINE CLINIC | Facility: CLINIC | Age: 57
End: 2022-12-16

## 2022-12-16 DIAGNOSIS — E03.9 ACQUIRED HYPOTHYROIDISM: ICD-10-CM

## 2022-12-16 LAB — TSH SERPL DL<=0.05 MIU/L-ACNC: 2.41 UIU/ML (ref 0.27–4.2)

## 2022-12-16 PROCEDURE — 84443 ASSAY THYROID STIM HORMONE: CPT

## 2022-12-16 PROCEDURE — 36415 COLL VENOUS BLD VENIPUNCTURE: CPT

## 2022-12-16 NOTE — TELEPHONE ENCOUNTER
Caller: Payal Eldridge    Relationship: Self    Best call back number: [phone]    What test was performed: THYROID TEST    When was the test performed: 12.16.2022    Where was the test performed: [facility]    Additional notes: PATIENT WOULD LIKE  A CALL TO DISCUSS THE RESULTS AND MEDICATIONS.

## 2022-12-16 NOTE — TELEPHONE ENCOUNTER
Lab results are not completed yet, we will contact patient once we receive the results and they are reviewed by provider

## 2022-12-19 RX ORDER — LEVOTHYROXINE SODIUM 0.12 MG/1
125 TABLET ORAL DAILY
Qty: 90 TABLET | Refills: 0 | Status: SHIPPED | OUTPATIENT
Start: 2022-12-19 | End: 2023-03-20

## 2023-01-31 ENCOUNTER — OFFICE VISIT (OUTPATIENT)
Dept: FAMILY MEDICINE CLINIC | Facility: CLINIC | Age: 58
End: 2023-01-31
Payer: COMMERCIAL

## 2023-01-31 ENCOUNTER — HOSPITAL ENCOUNTER (OUTPATIENT)
Dept: GENERAL RADIOLOGY | Facility: HOSPITAL | Age: 58
Discharge: HOME | End: 2023-01-31
Admitting: NURSE PRACTITIONER
Payer: COMMERCIAL

## 2023-01-31 VITALS
HEIGHT: 64 IN | WEIGHT: 149 LBS | HEART RATE: 69 BPM | TEMPERATURE: 97.3 F | SYSTOLIC BLOOD PRESSURE: 116 MMHG | OXYGEN SATURATION: 99 % | BODY MASS INDEX: 25.44 KG/M2 | DIASTOLIC BLOOD PRESSURE: 64 MMHG

## 2023-01-31 DIAGNOSIS — M54.42 CHRONIC LEFT-SIDED LOW BACK PAIN WITH LEFT-SIDED SCIATICA: ICD-10-CM

## 2023-01-31 DIAGNOSIS — G89.29 CHRONIC LEFT-SIDED LOW BACK PAIN WITH LEFT-SIDED SCIATICA: ICD-10-CM

## 2023-01-31 DIAGNOSIS — Z78.0 POST-MENOPAUSAL: Primary | ICD-10-CM

## 2023-01-31 PROCEDURE — 72100 X-RAY EXAM L-S SPINE 2/3 VWS: CPT

## 2023-01-31 PROCEDURE — 99213 OFFICE O/P EST LOW 20 MIN: CPT | Performed by: NURSE PRACTITIONER

## 2023-01-31 NOTE — PROGRESS NOTES
"Chief Complaint  Back Pain    Subjective         Payal Eldridge presents to [facility]  Presents to the office today with complaints of low back pain.  She states that this has been bothering her for approximately a week.  She does state that it seems to be getting somewhat better though.  She is requesting imaging of her low back.  Does state that approximately 4 years ago she had significant low back pain with sciatica and did physiotherapy which seemed to really help.  Patient states that she has been doing ibuprofen this back pain and it seems to help improve the discomfort.  Denies wanting any other medications for the back pain.  She is just concerned about getting imaging.  Patient also request a bone density as she is past due for this.    Back Pain         Objective     Vitals:    01/31/23 1106   BP: 116/64   BP Location: Right arm   Patient Position: Sitting   Cuff Size: Adult   Pulse: 69   Temp: 97.3 °F (36.3 °C)   TempSrc: Temporal   SpO2: 99%   Weight: 67.6 kg (149 lb)   Height: 162.6 cm (64\")      Body mass index is 25.58 kg/m².            Physical Exam  Vitals reviewed.   Constitutional:       Appearance: Normal appearance.   Cardiovascular:      Rate and Rhythm: Normal rate and regular rhythm.      Pulses: Normal pulses.      Heart sounds: Normal heart sounds, S1 normal and S2 normal. No murmur heard.  Pulmonary:      Effort: Pulmonary effort is normal. No respiratory distress.      Breath sounds: Normal breath sounds.   Musculoskeletal:        Back:    Skin:     General: Skin is warm and dry.   Neurological:      Mental Status: She is alert and oriented to person, place, and time.   Psychiatric:         Attention and Perception: Attention normal.         Mood and Affect: Mood normal.         Behavior: Behavior normal.          Result Review :   The following data was reviewed by: HERNAN Do on 01/31/2023:      Procedures    Assessment and Plan "   Diagnoses and all orders for this visit:    1. Post-menopausal (Primary)  -     DEXA Bone Density Axial; Future    2. Chronic left-sided low back pain with left-sided sciatica  -     XR Spine Lumbar 2 or 3 View; Future      Patient denies any urinary or bowel incontinence with the low back pain.  I did discuss that she could take ibuprofen 800 mg 3 times a day as needed.    Follow Up   No follow-ups on file.  Patient was given instructions and counseling regarding her condition or for health maintenance advice. Please see specific information pulled into the AVS if appropriate.

## 2023-02-08 ENCOUNTER — HOSPITAL ENCOUNTER (OUTPATIENT)
Dept: BONE DENSITY | Facility: HOSPITAL | Age: 58
Discharge: HOME | End: 2023-02-08
Admitting: NURSE PRACTITIONER
Payer: COMMERCIAL

## 2023-02-08 DIAGNOSIS — Z78.0 POST-MENOPAUSAL: ICD-10-CM

## 2023-02-08 PROCEDURE — 77080 DXA BONE DENSITY AXIAL: CPT

## 2023-02-15 ENCOUNTER — OFFICE VISIT (OUTPATIENT)
Dept: FAMILY MEDICINE CLINIC | Facility: CLINIC | Age: 58
End: 2023-02-15
Payer: COMMERCIAL

## 2023-02-15 ENCOUNTER — LAB (OUTPATIENT)
Dept: LAB | Facility: HOSPITAL | Age: 58
End: 2023-02-15
Payer: COMMERCIAL

## 2023-02-15 VITALS
TEMPERATURE: 97.8 F | HEIGHT: 64 IN | DIASTOLIC BLOOD PRESSURE: 70 MMHG | HEART RATE: 73 BPM | SYSTOLIC BLOOD PRESSURE: 125 MMHG | OXYGEN SATURATION: 96 % | BODY MASS INDEX: 24.96 KG/M2 | WEIGHT: 146.2 LBS

## 2023-02-15 DIAGNOSIS — M81.0 OSTEOPOROSIS WITHOUT CURRENT PATHOLOGICAL FRACTURE, UNSPECIFIED OSTEOPOROSIS TYPE: ICD-10-CM

## 2023-02-15 DIAGNOSIS — Z00.00 ANNUAL PHYSICAL EXAM: ICD-10-CM

## 2023-02-15 DIAGNOSIS — Z00.00 ANNUAL PHYSICAL EXAM: Primary | ICD-10-CM

## 2023-02-15 DIAGNOSIS — E03.9 ACQUIRED HYPOTHYROIDISM: ICD-10-CM

## 2023-02-15 LAB
ALBUMIN SERPL-MCNC: 4.9 G/DL (ref 3.5–5.2)
ALBUMIN/GLOB SERPL: 2 G/DL
ALP SERPL-CCNC: 59 U/L (ref 39–117)
ALT SERPL W P-5'-P-CCNC: 15 U/L (ref 1–33)
ANION GAP SERPL CALCULATED.3IONS-SCNC: 6.8 MMOL/L (ref 5–15)
AST SERPL-CCNC: 22 U/L (ref 1–32)
BASOPHILS # BLD AUTO: 0.04 10*3/MM3 (ref 0–0.2)
BASOPHILS NFR BLD AUTO: 0.6 % (ref 0–1.5)
BILIRUB SERPL-MCNC: 0.5 MG/DL (ref 0–1.2)
BUN SERPL-MCNC: 19 MG/DL (ref 6–20)
BUN/CREAT SERPL: 18.6 (ref 7–25)
CALCIUM SPEC-SCNC: 9.9 MG/DL (ref 8.6–10.5)
CHLORIDE SERPL-SCNC: 103 MMOL/L (ref 98–107)
CHOLEST SERPL-MCNC: 214 MG/DL (ref 0–200)
CO2 SERPL-SCNC: 29.2 MMOL/L (ref 22–29)
CREAT SERPL-MCNC: 1.02 MG/DL (ref 0.57–1)
DEPRECATED RDW RBC AUTO: 44.3 FL (ref 37–54)
EGFRCR SERPLBLD CKD-EPI 2021: 63.9 ML/MIN/1.73
EOSINOPHIL # BLD AUTO: 0.1 10*3/MM3 (ref 0–0.4)
EOSINOPHIL NFR BLD AUTO: 1.5 % (ref 0.3–6.2)
ERYTHROCYTE [DISTWIDTH] IN BLOOD BY AUTOMATED COUNT: 13.8 % (ref 12.3–15.4)
GLOBULIN UR ELPH-MCNC: 2.5 GM/DL
GLUCOSE SERPL-MCNC: 98 MG/DL (ref 65–99)
HBA1C MFR BLD: 5.1 % (ref 4.8–5.6)
HCT VFR BLD AUTO: 42.5 % (ref 34–46.6)
HDLC SERPL-MCNC: 109 MG/DL (ref 40–60)
HGB BLD-MCNC: 14.3 G/DL (ref 12–15.9)
IMM GRANULOCYTES # BLD AUTO: 0.01 10*3/MM3 (ref 0–0.05)
IMM GRANULOCYTES NFR BLD AUTO: 0.2 % (ref 0–0.5)
LDLC SERPL CALC-MCNC: 95 MG/DL (ref 0–100)
LDLC/HDLC SERPL: 0.86 {RATIO}
LYMPHOCYTES # BLD AUTO: 1.46 10*3/MM3 (ref 0.7–3.1)
LYMPHOCYTES NFR BLD AUTO: 22.5 % (ref 19.6–45.3)
MCH RBC QN AUTO: 29.4 PG (ref 26.6–33)
MCHC RBC AUTO-ENTMCNC: 33.6 G/DL (ref 31.5–35.7)
MCV RBC AUTO: 87.4 FL (ref 79–97)
MONOCYTES # BLD AUTO: 0.43 10*3/MM3 (ref 0.1–0.9)
MONOCYTES NFR BLD AUTO: 6.6 % (ref 5–12)
NEUTROPHILS NFR BLD AUTO: 4.45 10*3/MM3 (ref 1.7–7)
NEUTROPHILS NFR BLD AUTO: 68.6 % (ref 42.7–76)
NRBC BLD AUTO-RTO: 0 /100 WBC (ref 0–0.2)
PLATELET # BLD AUTO: 198 10*3/MM3 (ref 140–450)
PMV BLD AUTO: 11.1 FL (ref 6–12)
POTASSIUM SERPL-SCNC: 4.2 MMOL/L (ref 3.5–5.2)
PROT SERPL-MCNC: 7.4 G/DL (ref 6–8.5)
RBC # BLD AUTO: 4.86 10*6/MM3 (ref 3.77–5.28)
SODIUM SERPL-SCNC: 139 MMOL/L (ref 136–145)
TRIGL SERPL-MCNC: 58 MG/DL (ref 0–150)
TSH SERPL DL<=0.05 MIU/L-ACNC: 1.15 UIU/ML (ref 0.27–4.2)
VLDLC SERPL-MCNC: 10 MG/DL (ref 5–40)
WBC NRBC COR # BLD: 6.49 10*3/MM3 (ref 3.4–10.8)

## 2023-02-15 PROCEDURE — 99396 PREV VISIT EST AGE 40-64: CPT | Performed by: STUDENT IN AN ORGANIZED HEALTH CARE EDUCATION/TRAINING PROGRAM

## 2023-02-15 PROCEDURE — 99213 OFFICE O/P EST LOW 20 MIN: CPT | Performed by: STUDENT IN AN ORGANIZED HEALTH CARE EDUCATION/TRAINING PROGRAM

## 2023-02-15 PROCEDURE — 80061 LIPID PANEL: CPT

## 2023-02-15 PROCEDURE — 82652 VIT D 1 25-DIHYDROXY: CPT

## 2023-02-15 PROCEDURE — 36415 COLL VENOUS BLD VENIPUNCTURE: CPT

## 2023-02-15 PROCEDURE — 80050 GENERAL HEALTH PANEL: CPT

## 2023-02-15 PROCEDURE — 83036 HEMOGLOBIN GLYCOSYLATED A1C: CPT

## 2023-02-15 RX ORDER — ALENDRONATE SODIUM 70 MG/1
70 TABLET ORAL
Qty: 4 TABLET | Refills: 8 | Status: SHIPPED | OUTPATIENT
Start: 2023-02-15

## 2023-02-15 RX ORDER — COLLAGENASE CLOSTRIDIUM HIST.
POWDER (EA) MISCELLANEOUS
COMMUNITY
Start: 2022-01-01

## 2023-02-15 NOTE — PROGRESS NOTES
"Chief Complaint  Annual physical and following up on abnormal DEXA scan/hypothyroid    Subjective         Payal Eldridge is a 57 y.o. female who presents to [facility]    57 years old female comes to the clinic today for annual physical and follow-up.    Patient had DEXA scan done recently which showed osteoporosis, patient would be interested in alendronate as it was discussed with her prior PCP as well.    Hypothyroid is controlled on current dose of levothyroxine    Denies any chest pain or shortness of breath on exertion.  Physically very active.  Does not report any history of any osteonecrosis or any other bone issues.    Objective   Vital Signs:   Vitals:    02/15/23 0727   BP: 125/70   BP Location: Left arm   Patient Position: Sitting   Cuff Size: Adult   Pulse: 73   Temp: 97.8 °F (36.6 °C)   TempSrc: Temporal   SpO2: 96%   Weight: 66.3 kg (146 lb 3.2 oz)   Height: 162.6 cm (64.02\")      Body mass index is 25.08 kg/m².   Wt Readings from Last 3 Encounters:   02/15/23 66.3 kg (146 lb 3.2 oz)   01/31/23 67.6 kg (149 lb)   07/12/22 66.2 kg (146 lb)      BP Readings from Last 3 Encounters:   02/15/23 125/70   01/31/23 116/64   07/12/22 133/79        Patient Care Team:  Almas Madsen MD as PCP - General (Family Medicine)  Rebekah Arzate MD as Consulting Physician (Obstetrics and Gynecology)     Physical Exam  Vitals reviewed.   Constitutional:       Appearance: Normal appearance. She is well-developed.   HENT:      Head: Normocephalic and atraumatic.      Right Ear: External ear normal.      Left Ear: External ear normal.      Mouth/Throat:      Pharynx: No oropharyngeal exudate.   Eyes:      Conjunctiva/sclera: Conjunctivae normal.      Pupils: Pupils are equal, round, and reactive to light.   Cardiovascular:      Rate and Rhythm: Normal rate and regular rhythm.      Heart sounds: No murmur heard.    No friction rub. No gallop.   Pulmonary:      Effort: Pulmonary " effort is normal.      Breath sounds: Normal breath sounds. No wheezing or rhonchi.   Abdominal:      General: Bowel sounds are normal. There is no distension.      Palpations: Abdomen is soft.      Tenderness: There is no abdominal tenderness.   Skin:     General: Skin is warm and dry.   Neurological:      Mental Status: She is alert and oriented to person, place, and time.      Cranial Nerves: No cranial nerve deficit.   Psychiatric:         Mood and Affect: Mood and affect normal.         Behavior: Behavior normal.         Thought Content: Thought content normal.         Judgment: Judgment normal.                       Assessment and Plan   Diagnoses and all orders for this visit:    1. Annual physical exam (Primary)  Comments:  Daily exercise and healthy diet recommended  Orders:  -     TSH Rfx On Abnormal To Free T4; Future  -     CBC & Differential; Future  -     Comprehensive Metabolic Panel; Future  -     Hemoglobin A1c; Future  -     Lipid Panel; Future  -     Vitamin D 1,25 Dihydroxy; Future    2. Osteoporosis without current pathological fracture, unspecified osteoporosis type  Comments:  Weightbearing exercises/vitamin D and calcium supplements and alendronate discussed in great detail/risk and medication management discussed  Orders:  -     TSH Rfx On Abnormal To Free T4; Future  -     CBC & Differential; Future  -     Comprehensive Metabolic Panel; Future  -     Hemoglobin A1c; Future  -     Lipid Panel; Future  -     alendronate (Fosamax) 70 MG tablet; Take 1 tablet by mouth Every 7 (Seven) Days.  Dispense: 4 tablet; Refill: 8  -     Vitamin D 1,25 Dihydroxy; Future    3. Acquired hypothyroidism  Comments:  Chronic, controlled, continue with current dose of levothyroxine  Orders:  -     TSH Rfx On Abnormal To Free T4; Future  -     CBC & Differential; Future  -     Comprehensive Metabolic Panel; Future  -     Hemoglobin A1c; Future  -     Lipid Panel; Future  -     Vitamin D 1,25 Dihydroxy;  Future          Tobacco Use: Low Risk    • Smoking Tobacco Use: Never   • Smokeless Tobacco Use: Never   • Passive Exposure: Not on file            Follow Up   Return in about 6 months (around 8/15/2023) for Recheck, Next scheduled follow up.  Patient was given instructions and counseling regarding her condition or for health maintenance advice. Please see specific information pulled into the AVS if appropriate.

## 2023-02-17 LAB — 1,25(OH)2D SERPL-MCNC: 45.5 PG/ML (ref 24.8–81.5)

## 2023-03-09 ENCOUNTER — TELEPHONE (OUTPATIENT)
Dept: FAMILY MEDICINE CLINIC | Facility: CLINIC | Age: 58
End: 2023-03-09
Payer: COMMERCIAL

## 2023-03-09 NOTE — TELEPHONE ENCOUNTER
PATIENT CALLED AND SAID SHE HAS BEEN FEELING A LITTLE FATIGUED AND DIZZY.   BP HAS BEEN SLIGHTLY ELEVATED FOR ABOUT 2 WEEKS AND WANTED TO KNOW IF THIS COULD BE A RESULT OF THE FOSAMAX?

## 2023-03-20 RX ORDER — LEVOTHYROXINE SODIUM 0.12 MG/1
125 TABLET ORAL DAILY
Qty: 90 TABLET | Refills: 0 | Status: SHIPPED | OUTPATIENT
Start: 2023-03-20

## 2023-08-16 ENCOUNTER — TELEPHONE (OUTPATIENT)
Dept: FAMILY MEDICINE CLINIC | Facility: CLINIC | Age: 58
End: 2023-08-16

## 2023-08-16 NOTE — TELEPHONE ENCOUNTER
Caller: Payal Eldridge    Relationship: Self    Best call back number: [phone]    Who is your current provider: NIC    Who would you like your new provider to be: CARLOS BO    What are your reasons for transferring care: RATHER SEE FEMALE PROVIDER    Additional notes: PATIENT IS REQUESTING CALL TO SCHEDULE WITH HER.

## 2023-08-31 ENCOUNTER — TELEPHONE (OUTPATIENT)
Dept: FAMILY MEDICINE CLINIC | Facility: CLINIC | Age: 58
End: 2023-08-31

## 2023-08-31 NOTE — TELEPHONE ENCOUNTER
Caller: Payal Eldridge    Relationship to patient: Self    Best call back number: [phone]    Patient is needing: PATIENT CALLED STATING A COUPLE MONTHS AGO MD LUCERO HAD TOLD HER TO STOP TAKING THE FOSAMAX DUE TO SOME SYMPTOMS SHE WAS HAVING BUT SHE WAS NEVER TOLD IF SHE WAS SUPPOSED TO START TAKING A NEW MEDICATION IN REPLACE OF STOPPING THE FOSAMAX OR IF MD LUCERO WAS WANTING HER TO TRY ANYTHING ELSE FOR HER OSTEOPOROSIS. SHE IS NEEDING TO KNOW IF SHE NEEDS TO MAKE AN APPOINTMENT OR IF MD LUCERO COULD CALL HER AND LET HER KNOW WHAT HE RECOMMENDS.

## 2023-09-12 RX ORDER — LEVOTHYROXINE SODIUM 0.12 MG/1
125 TABLET ORAL DAILY
Qty: 90 TABLET | Refills: 0 | Status: SHIPPED | OUTPATIENT
Start: 2023-09-12

## 2023-11-02 ENCOUNTER — OFFICE VISIT (OUTPATIENT)
Dept: FAMILY MEDICINE CLINIC | Facility: CLINIC | Age: 58
End: 2023-11-02
Payer: COMMERCIAL

## 2023-11-02 VITALS
SYSTOLIC BLOOD PRESSURE: 144 MMHG | HEART RATE: 79 BPM | TEMPERATURE: 97 F | OXYGEN SATURATION: 98 % | DIASTOLIC BLOOD PRESSURE: 80 MMHG | BODY MASS INDEX: 25.51 KG/M2 | HEIGHT: 64 IN | WEIGHT: 149.4 LBS

## 2023-11-02 DIAGNOSIS — L72.0 EPIDERMAL CYST OF NECK: ICD-10-CM

## 2023-11-02 DIAGNOSIS — E03.9 ACQUIRED HYPOTHYROIDISM: Primary | ICD-10-CM

## 2023-11-02 PROCEDURE — 99213 OFFICE O/P EST LOW 20 MIN: CPT | Performed by: NURSE PRACTITIONER

## 2023-11-02 NOTE — PROGRESS NOTES
Chief Complaint  Discuss medications and Mass (On neck)    Subjective        Payal CERVANTES Jazmyn presents to [facility]  History of Present Illness  States started with dizziness after starting fosamax.  States that called to stop medication to see if helps. And felt better after stopping.  Had been on estrogen and levothyroxine.    Doesn't want to take a medication with the side effect she had.  States she has started a bone health. Has started weight training.      Mass on your neck that was a cyst that she has had for a year.       The following portions of the patient's history were personally reviewed and updated as appropriate: allergies, current medications, past medical history, past surgical history, past family history, and past social history.     Body mass index is 25.63 kg/m².           Past History:    Medical History: has a past medical history of Abnormal vaginal Pap smear, Allergies, Anxiety, Cardiac dysrhythmia, Costochondritis, Endometriosis of uterus, History of cancer chemotherapy, migraine headaches, Hypothyroidism, Irritable bowel syndrome, Melanoma, and Screening mammogram, encounter for (04/2019).     Surgical History: has a past surgical history that includes Cholecystectomy; Colonoscopy (2017); Dilation and curettage of uterus; Hysterectomy; LASIK; Tubal ligation; Esophagogastroduodenoscopy (N/A, 7/26/2021); and Colonoscopy (N/A, 7/26/2021).     Family History: family history includes Cancer in her father and sister; Colon cancer (age of onset: 53) in her father; Diabetes in her sister; Stroke in her maternal grandmother.     Social History: reports that she has never smoked. She has never used smokeless tobacco. She reports current alcohol use of about 5.0 standard drinks of alcohol per week. She reports that she does not use drugs.    Allergies: Patient has no known allergies.          Current Outpatient Medications:     Collagenase powder, ,  "Disp: , Rfl:     levothyroxine (SYNTHROID, LEVOTHROID) 125 MCG tablet, Take 1 tablet by mouth Daily., Disp: 90 tablet, Rfl: 0    multivitamin with minerals tablet tablet, multivitamin Oral Tablet take 1 tablet by oral route once daily with food   Suspended, Disp: , Rfl:     Nutritional Supplements (MAXINE-BALANCE PO), , Disp: , Rfl:     Nutritional Supplements (OSTEO ADVANCE PO), , Disp: , Rfl:     alendronate (Fosamax) 70 MG tablet, Take 1 tablet by mouth Every 7 (Seven) Days., Disp: 4 tablet, Rfl: 8    There are no discontinued medications.      Review of Systems   Constitutional:  Negative for fever.   Respiratory:  Negative for cough and shortness of breath.    Cardiovascular:  Negative for chest pain, palpitations and leg swelling.   Neurological:  Negative for dizziness, weakness, numbness and headache.        Objective         Vitals:    11/02/23 0931   BP: 132/96   BP Location: Right arm   Patient Position: Sitting   Cuff Size: Small Adult   Pulse: 79   Temp: 97 °F (36.1 °C)   SpO2: 98%   Weight: 67.8 kg (149 lb 6.4 oz)   Height: 162.6 cm (64.02\")     Body mass index is 25.63 kg/m².         Physical Exam  Vitals reviewed.   Constitutional:       Appearance: Normal appearance. She is well-developed.   HENT:      Head: Normocephalic and atraumatic.      Mouth/Throat:      Pharynx: No oropharyngeal exudate.   Eyes:      Conjunctiva/sclera: Conjunctivae normal.      Pupils: Pupils are equal, round, and reactive to light.   Cardiovascular:      Rate and Rhythm: Normal rate and regular rhythm.      Heart sounds: Normal heart sounds. No murmur heard.     No friction rub. No gallop.   Pulmonary:      Effort: Pulmonary effort is normal.      Breath sounds: Normal breath sounds. No wheezing or rhonchi.   Skin:     General: Skin is warm and dry.             Comments: Cyst on left side of neck.   Neurological:      Mental Status: She is alert and oriented to person, place, and time.   Psychiatric:         Mood and Affect: " Mood and affect normal.         Behavior: Behavior normal.         Thought Content: Thought content normal.         Judgment: Judgment normal.             Result Review :               Assessment and Plan     Diagnoses and all orders for this visit:    1. Acquired hypothyroidism (Primary)  -     TSH; Future    2. Epidermal cyst of neck  -     Ambulatory Referral to Dermatology      Will recheck blood pressure.  Has chosen to not to treat osteoporosis currently and understands the medication.        Follow Up     Return in about 6 months (around 5/2/2024) for with Dr. rodríguez.    Patient was given instructions and counseling regarding her condition or for health maintenance advice. Please see specific information pulled into the AVS if appropriate.         Answers submitted by the patient for this visit:  Other (Submitted on 10/29/2023)  Please describe your symptoms.: I need a new osteoporosis medication. The first one I took had side effects. I also have a lump on the side of my neck that needs to be looked at and maybe removed.  Have you had these symptoms before?: No  How long have you been having these symptoms?: Greater than 2 weeks  Please list any medications you are currently taking for this condition.: 125mcy Synthroid  Primary Reason for Visit (Submitted on 10/29/2023)  What is the primary reason for your visit?: Other

## 2023-11-08 ENCOUNTER — LAB (OUTPATIENT)
Dept: LAB | Facility: HOSPITAL | Age: 58
End: 2023-11-08
Payer: COMMERCIAL

## 2023-11-08 DIAGNOSIS — E03.9 ACQUIRED HYPOTHYROIDISM: ICD-10-CM

## 2023-11-08 LAB — TSH SERPL DL<=0.05 MIU/L-ACNC: 0.76 UIU/ML (ref 0.27–4.2)

## 2023-11-08 PROCEDURE — 36415 COLL VENOUS BLD VENIPUNCTURE: CPT

## 2023-11-08 PROCEDURE — 84443 ASSAY THYROID STIM HORMONE: CPT

## 2023-12-18 RX ORDER — LEVOTHYROXINE SODIUM 0.12 MG/1
125 TABLET ORAL DAILY
Qty: 90 TABLET | Refills: 0 | Status: SHIPPED | OUTPATIENT
Start: 2023-12-18

## 2024-03-25 RX ORDER — LEVOTHYROXINE SODIUM 0.12 MG/1
125 TABLET ORAL DAILY
Qty: 90 TABLET | Refills: 0 | Status: SHIPPED | OUTPATIENT
Start: 2024-03-25

## 2024-05-03 ENCOUNTER — OFFICE VISIT (OUTPATIENT)
Dept: FAMILY MEDICINE CLINIC | Facility: CLINIC | Age: 59
End: 2024-05-03
Payer: COMMERCIAL

## 2024-05-03 ENCOUNTER — LAB (OUTPATIENT)
Dept: LAB | Facility: HOSPITAL | Age: 59
End: 2024-05-03
Payer: COMMERCIAL

## 2024-05-03 VITALS
BODY MASS INDEX: 25.61 KG/M2 | WEIGHT: 150 LBS | RESPIRATION RATE: 16 BRPM | HEIGHT: 64 IN | SYSTOLIC BLOOD PRESSURE: 124 MMHG | OXYGEN SATURATION: 98 % | HEART RATE: 79 BPM | TEMPERATURE: 98.3 F | DIASTOLIC BLOOD PRESSURE: 62 MMHG

## 2024-05-03 DIAGNOSIS — Z11.59 NEED FOR HEPATITIS C SCREENING TEST: ICD-10-CM

## 2024-05-03 DIAGNOSIS — M81.0 OSTEOPOROSIS, UNSPECIFIED OSTEOPOROSIS TYPE, UNSPECIFIED PATHOLOGICAL FRACTURE PRESENCE: ICD-10-CM

## 2024-05-03 DIAGNOSIS — R53.82 CHRONIC FATIGUE: ICD-10-CM

## 2024-05-03 DIAGNOSIS — E03.9 ACQUIRED HYPOTHYROIDISM: ICD-10-CM

## 2024-05-03 DIAGNOSIS — Z00.00 ANNUAL PHYSICAL EXAM: ICD-10-CM

## 2024-05-03 DIAGNOSIS — Z00.00 ANNUAL PHYSICAL EXAM: Primary | ICD-10-CM

## 2024-05-03 DIAGNOSIS — Z12.31 ENCOUNTER FOR SCREENING MAMMOGRAM FOR MALIGNANT NEOPLASM OF BREAST: ICD-10-CM

## 2024-05-03 LAB
ALBUMIN SERPL-MCNC: 4.9 G/DL (ref 3.5–5.2)
ALBUMIN/GLOB SERPL: 2 G/DL
ALP SERPL-CCNC: 57 U/L (ref 39–117)
ALT SERPL W P-5'-P-CCNC: 19 U/L (ref 1–33)
ANION GAP SERPL CALCULATED.3IONS-SCNC: 9 MMOL/L (ref 5–15)
AST SERPL-CCNC: 16 U/L (ref 1–32)
BACTERIA UR QL AUTO: ABNORMAL /HPF
BASOPHILS # BLD AUTO: 0.04 10*3/MM3 (ref 0–0.2)
BASOPHILS NFR BLD AUTO: 0.6 % (ref 0–1.5)
BILIRUB SERPL-MCNC: 0.4 MG/DL (ref 0–1.2)
BILIRUB UR QL STRIP: NEGATIVE
BUN SERPL-MCNC: 16 MG/DL (ref 8–23)
BUN/CREAT SERPL: 15.4 (ref 7–25)
CALCIUM SPEC-SCNC: 9.6 MG/DL (ref 8.6–10.5)
CHLORIDE SERPL-SCNC: 105 MMOL/L (ref 98–107)
CHOLEST SERPL-MCNC: 195 MG/DL (ref 0–200)
CLARITY UR: CLEAR
CO2 SERPL-SCNC: 28 MMOL/L (ref 22–29)
COLOR UR: YELLOW
CREAT SERPL-MCNC: 1.04 MG/DL (ref 0.57–1)
DEPRECATED RDW RBC AUTO: 42.2 FL (ref 37–54)
EGFRCR SERPLBLD CKD-EPI 2021: 61.7 ML/MIN/1.73
EOSINOPHIL # BLD AUTO: 0.12 10*3/MM3 (ref 0–0.4)
EOSINOPHIL NFR BLD AUTO: 1.9 % (ref 0.3–6.2)
ERYTHROCYTE [DISTWIDTH] IN BLOOD BY AUTOMATED COUNT: 13.1 % (ref 12.3–15.4)
FOLATE SERPL-MCNC: >20 NG/ML (ref 4.78–24.2)
GLOBULIN UR ELPH-MCNC: 2.5 GM/DL
GLUCOSE SERPL-MCNC: 93 MG/DL (ref 65–99)
GLUCOSE UR STRIP-MCNC: NEGATIVE MG/DL
HBA1C MFR BLD: 5.3 % (ref 4.8–5.6)
HCT VFR BLD AUTO: 43.8 % (ref 34–46.6)
HCV AB SER QL: NORMAL
HDLC SERPL-MCNC: 89 MG/DL (ref 40–60)
HGB BLD-MCNC: 14.8 G/DL (ref 12–15.9)
HGB UR QL STRIP.AUTO: NEGATIVE
HYALINE CASTS UR QL AUTO: ABNORMAL /LPF
IMM GRANULOCYTES # BLD AUTO: 0.01 10*3/MM3 (ref 0–0.05)
IMM GRANULOCYTES NFR BLD AUTO: 0.2 % (ref 0–0.5)
KETONES UR QL STRIP: NEGATIVE
LDLC SERPL CALC-MCNC: 97 MG/DL (ref 0–100)
LDLC/HDLC SERPL: 1.08 {RATIO}
LEUKOCYTE ESTERASE UR QL STRIP.AUTO: ABNORMAL
LYMPHOCYTES # BLD AUTO: 1.36 10*3/MM3 (ref 0.7–3.1)
LYMPHOCYTES NFR BLD AUTO: 21.9 % (ref 19.6–45.3)
MCH RBC QN AUTO: 29.5 PG (ref 26.6–33)
MCHC RBC AUTO-ENTMCNC: 33.8 G/DL (ref 31.5–35.7)
MCV RBC AUTO: 87.3 FL (ref 79–97)
MONOCYTES # BLD AUTO: 0.44 10*3/MM3 (ref 0.1–0.9)
MONOCYTES NFR BLD AUTO: 7.1 % (ref 5–12)
NEUTROPHILS NFR BLD AUTO: 4.23 10*3/MM3 (ref 1.7–7)
NEUTROPHILS NFR BLD AUTO: 68.3 % (ref 42.7–76)
NITRITE UR QL STRIP: NEGATIVE
NRBC BLD AUTO-RTO: 0 /100 WBC (ref 0–0.2)
PH UR STRIP.AUTO: 8.5 [PH] (ref 5–8)
PLATELET # BLD AUTO: 203 10*3/MM3 (ref 140–450)
PMV BLD AUTO: 11.5 FL (ref 6–12)
POTASSIUM SERPL-SCNC: 4.3 MMOL/L (ref 3.5–5.2)
PROT SERPL-MCNC: 7.4 G/DL (ref 6–8.5)
PROT UR QL STRIP: NEGATIVE
RBC # BLD AUTO: 5.02 10*6/MM3 (ref 3.77–5.28)
RBC # UR STRIP: ABNORMAL /HPF
REF LAB TEST METHOD: ABNORMAL
SODIUM SERPL-SCNC: 142 MMOL/L (ref 136–145)
SP GR UR STRIP: 1.01 (ref 1–1.03)
SQUAMOUS #/AREA URNS HPF: ABNORMAL /HPF
TRIGL SERPL-MCNC: 49 MG/DL (ref 0–150)
TSH SERPL DL<=0.05 MIU/L-ACNC: 0.31 UIU/ML (ref 0.27–4.2)
UROBILINOGEN UR QL STRIP: ABNORMAL
VIT B12 BLD-MCNC: 952 PG/ML (ref 211–946)
VLDLC SERPL-MCNC: 9 MG/DL (ref 5–40)
WBC # UR STRIP: ABNORMAL /HPF
WBC NRBC COR # BLD AUTO: 6.2 10*3/MM3 (ref 3.4–10.8)

## 2024-05-03 PROCEDURE — 80050 GENERAL HEALTH PANEL: CPT

## 2024-05-03 PROCEDURE — 82607 VITAMIN B-12: CPT

## 2024-05-03 PROCEDURE — 36415 COLL VENOUS BLD VENIPUNCTURE: CPT

## 2024-05-03 PROCEDURE — 80061 LIPID PANEL: CPT

## 2024-05-03 PROCEDURE — 83036 HEMOGLOBIN GLYCOSYLATED A1C: CPT

## 2024-05-03 PROCEDURE — 81001 URINALYSIS AUTO W/SCOPE: CPT

## 2024-05-03 PROCEDURE — 82746 ASSAY OF FOLIC ACID SERUM: CPT

## 2024-05-03 PROCEDURE — 86803 HEPATITIS C AB TEST: CPT

## 2024-05-03 PROCEDURE — 86038 ANTINUCLEAR ANTIBODIES: CPT

## 2024-05-03 NOTE — PROGRESS NOTES
"Chief Complaint  Annual Exam    Subjective         Payal Eldridge is a 59 y.o. female who presents to [facility]  59 years old comes to the clinic today for annual physical.    Complaining of mild fatigue but does not report any other acute issues.  Compliant with levothyroxine.    Patient has osteoporosis, does not want to try any medication as last time Fosamax possibly increased her blood pressure.  Patient is still doing calcium/vitamin D and weightbearing exercises.    Physically active otherwise, 12+ review of systems are unremarkable otherwise.    Denies any chest pain or shortness of breath on exertion          Objective   Vital Signs:   Vitals:    05/03/24 0706   BP: 124/62   Pulse: 79   Resp: 16   Temp: 98.3 °F (36.8 °C)   SpO2: 98%   Weight: 68 kg (150 lb)   Height: 162.6 cm (64\")      Body mass index is 25.75 kg/m².   Wt Readings from Last 3 Encounters:   05/03/24 68 kg (150 lb)   11/02/23 67.8 kg (149 lb 6.4 oz)   02/15/23 66.3 kg (146 lb 3.2 oz)      BP Readings from Last 3 Encounters:   05/03/24 124/62   11/02/23 144/80   02/15/23 125/70        Patient Care Team:  Almas Madsen MD as PCP - General (Family Medicine)  Rebekah Arzate MD as Consulting Physician (Obstetrics and Gynecology)     Physical Exam  Vitals reviewed.   Constitutional:       Appearance: Normal appearance. She is well-developed.   HENT:      Head: Normocephalic and atraumatic.      Right Ear: External ear normal.      Left Ear: External ear normal.      Mouth/Throat:      Pharynx: No oropharyngeal exudate.   Eyes:      Conjunctiva/sclera: Conjunctivae normal.      Pupils: Pupils are equal, round, and reactive to light.   Cardiovascular:      Rate and Rhythm: Normal rate and regular rhythm.      Heart sounds: No murmur heard.     No friction rub. No gallop.   Pulmonary:      Effort: Pulmonary effort is normal.      Breath sounds: Normal breath sounds. No wheezing or rhonchi.   Abdominal: "      General: Bowel sounds are normal. There is no distension.      Palpations: Abdomen is soft.      Tenderness: There is no abdominal tenderness.   Skin:     General: Skin is warm and dry.   Neurological:      Mental Status: She is alert and oriented to person, place, and time.      Cranial Nerves: No cranial nerve deficit.   Psychiatric:         Mood and Affect: Mood and affect normal.         Behavior: Behavior normal.         Thought Content: Thought content normal.         Judgment: Judgment normal.             BMI is >= 25 and <30. (Overweight) The following options were offered after discussion;: weight loss educational material (shared in after visit summary), exercise counseling/recommendations, and nutrition counseling/recommendations            Assessment and Plan   Diagnoses and all orders for this visit:    1. Annual physical exam (Primary)  Comments:  Daily exercise recommended  Orders:  -     TSH Rfx On Abnormal To Free T4; Future  -     CBC & Differential; Future  -     Comprehensive Metabolic Panel; Future  -     Hemoglobin A1c; Future  -     Lipid Panel; Future  -     Urinalysis With Microscopic - Urine, Clean Catch; Future  -     Vitamin B12; Future  -     Folate; Future  -     ZHAO With / DsDNA, RNP, Sjogrens A / B, Cheung; Future    2. Acquired hypothyroidism  -     TSH Rfx On Abnormal To Free T4; Future  -     CBC & Differential; Future  -     Comprehensive Metabolic Panel; Future  -     Hemoglobin A1c; Future  -     Lipid Panel; Future  -     Urinalysis With Microscopic - Urine, Clean Catch; Future  -     Vitamin B12; Future  -     Folate; Future  -     ZHAO With / DsDNA, RNP, Sjogrens A / B, Cheung; Future    3. Chronic fatigue  -     Vitamin B12; Future  -     Folate; Future  -     ZHAO With / DsDNA, RNP, Sjogrens A / B, Cheung; Future    4. Need for hepatitis C screening test  -     Hepatitis C Antibody; Future    5. Encounter for screening mammogram for malignant neoplasm of breast  -     Mammo  Screening Digital Tomosynthesis Bilateral With CAD; Future    6. Osteoporosis, unspecified osteoporosis type, unspecified pathological fracture presence          Tobacco Use: Low Risk  (5/3/2024)    Patient History    • Smoking Tobacco Use: Never    • Smokeless Tobacco Use: Never    • Passive Exposure: Not on file            Follow Up   Return in about 6 months (around 11/3/2024) for Next scheduled follow up.  Patient was given instructions and counseling regarding her condition or for health maintenance advice. Please see specific information pulled into the AVS if appropriate.

## 2024-05-06 LAB — ANA SER QL: NEGATIVE

## 2024-06-24 RX ORDER — LEVOTHYROXINE SODIUM 0.12 MG/1
125 TABLET ORAL DAILY
Qty: 90 TABLET | Refills: 0 | Status: SHIPPED | OUTPATIENT
Start: 2024-06-24

## 2024-07-09 ENCOUNTER — OFFICE VISIT (OUTPATIENT)
Dept: FAMILY MEDICINE CLINIC | Facility: CLINIC | Age: 59
End: 2024-07-09
Payer: COMMERCIAL

## 2024-07-09 VITALS
WEIGHT: 150 LBS | TEMPERATURE: 97 F | HEIGHT: 64 IN | RESPIRATION RATE: 16 BRPM | OXYGEN SATURATION: 98 % | HEART RATE: 80 BPM | BODY MASS INDEX: 25.61 KG/M2 | DIASTOLIC BLOOD PRESSURE: 74 MMHG | SYSTOLIC BLOOD PRESSURE: 134 MMHG

## 2024-07-09 DIAGNOSIS — H61.21 IMPACTED CERUMEN OF RIGHT EAR: ICD-10-CM

## 2024-07-09 DIAGNOSIS — R42 VERTIGO: Primary | ICD-10-CM

## 2024-07-09 PROCEDURE — 99213 OFFICE O/P EST LOW 20 MIN: CPT | Performed by: STUDENT IN AN ORGANIZED HEALTH CARE EDUCATION/TRAINING PROGRAM

## 2024-07-09 PROCEDURE — 69209 REMOVE IMPACTED EAR WAX UNI: CPT | Performed by: STUDENT IN AN ORGANIZED HEALTH CARE EDUCATION/TRAINING PROGRAM

## 2024-07-09 RX ORDER — PREDNISONE 20 MG/1
20 TABLET ORAL 2 TIMES DAILY
Qty: 8 TABLET | Refills: 0 | Status: SHIPPED | OUTPATIENT
Start: 2024-07-09

## 2024-07-09 RX ORDER — MECLIZINE HYDROCHLORIDE 25 MG/1
25 TABLET ORAL 3 TIMES DAILY PRN
Qty: 20 TABLET | Refills: 0 | Status: SHIPPED | OUTPATIENT
Start: 2024-07-09

## 2024-07-09 NOTE — PROGRESS NOTES
"Chief Complaint  Vertigo    Subjective         Payal HELEN Eldridge is a 59 y.o. female who presents to [facility]    59 years old comes to the clinic today for vertigo for 1 week.    Patient reports some vertigo when she lays on left side.  No vertigo while walking/exercising or sitting.  Does not report any blood pressure changes/falls/vision changes or any chest pain/shortness of breath.  Does not report any fever/cough or any recent URI.    Does report some nausea associated with vertigo.  Patient reports room spinning sensation while sleeping on left ear.    Denies any other acute complaint.  No palpitations/dizziness    Objective   Vital Signs:   Vitals:    07/09/24 1527   BP: 134/74   Pulse: 80   Resp: 16   Temp: 97 °F (36.1 °C)   SpO2: 98%   Weight: 68 kg (150 lb)   Height: 162.6 cm (64\")      Body mass index is 25.75 kg/m².   Wt Readings from Last 3 Encounters:   07/09/24 68 kg (150 lb)   05/03/24 68 kg (150 lb)   11/02/23 67.8 kg (149 lb 6.4 oz)      BP Readings from Last 3 Encounters:   07/09/24 134/74   05/03/24 124/62   11/02/23 144/80        Patient Care Team:  Almas Madsen MD as PCP - General (Family Medicine)  Rebekah Arzate MD as Consulting Physician (Obstetrics and Gynecology)     Physical Exam  Vitals reviewed.   Constitutional:       Appearance: Normal appearance. She is well-developed.   HENT:      Head: Normocephalic and atraumatic.      Right Ear: External ear normal. There is impacted cerumen.      Left Ear: Tympanic membrane and external ear normal.      Mouth/Throat:      Pharynx: No oropharyngeal exudate.   Eyes:      Conjunctiva/sclera: Conjunctivae normal.      Pupils: Pupils are equal, round, and reactive to light.   Cardiovascular:      Rate and Rhythm: Normal rate and regular rhythm.      Heart sounds: No murmur heard.     No friction rub. No gallop.   Pulmonary:      Effort: Pulmonary effort is normal.      Breath sounds: Normal breath " sounds. No wheezing or rhonchi.   Abdominal:      General: Bowel sounds are normal. There is no distension.      Palpations: Abdomen is soft.      Tenderness: There is no abdominal tenderness.   Skin:     General: Skin is warm and dry.   Neurological:      Mental Status: She is alert and oriented to person, place, and time.      Cranial Nerves: No cranial nerve deficit.   Psychiatric:         Mood and Affect: Mood and affect normal.         Behavior: Behavior normal.         Thought Content: Thought content normal.         Judgment: Judgment normal.              Ear Cerumen Removal    Date/Time: 7/9/2024 4:33 PM    Performed by: Almas Madsen MD  Authorized by: Almas Madsen MD    Anesthesia:  Local Anesthetic: none  Location details: right ear  Patient tolerance: patient tolerated the procedure well with no immediate complications  Procedure type: irrigation   Sedation:  Patient sedated: no                       Assessment and Plan   Diagnoses and all orders for this visit:    1. Vertigo (Primary)  -     meclizine (ANTIVERT) 25 MG tablet; Take 1 tablet by mouth 3 (Three) Times a Day As Needed for Dizziness.  Dispense: 20 tablet; Refill: 0  -     predniSONE (DELTASONE) 20 MG tablet; Take 1 tablet by mouth 2 (Two) Times a Day.  Dispense: 8 tablet; Refill: 0    2. Impacted cerumen of right ear  -     meclizine (ANTIVERT) 25 MG tablet; Take 1 tablet by mouth 3 (Three) Times a Day As Needed for Dizziness.  Dispense: 20 tablet; Refill: 0  -     predniSONE (DELTASONE) 20 MG tablet; Take 1 tablet by mouth 2 (Two) Times a Day.  Dispense: 8 tablet; Refill: 0    Other orders  -     Ear Cerumen Removal      After reviewing patient's symptoms and benign nature of her complaint, mutual decision made to try prednisone/meclizine for next few days.  Patient is aware that she will need imaging workup/blood work and more workup if not improved or any worsening at given time.    Patient understands and agrees with current plan, will  rule out neurologic and cardiac cause if not improved within next few days.        Tobacco Use: Low Risk  (7/9/2024)    Patient History     Smoking Tobacco Use: Never     Smokeless Tobacco Use: Never     Passive Exposure: Not on file            Follow Up   Return if symptoms worsen or fail to improve.  Patient was given instructions and counseling regarding her condition or for health maintenance advice. Please see specific information pulled into the AVS if appropriate.

## 2024-07-12 ENCOUNTER — TELEPHONE (OUTPATIENT)
Dept: FAMILY MEDICINE CLINIC | Facility: CLINIC | Age: 59
End: 2024-07-12
Payer: COMMERCIAL

## 2024-07-12 DIAGNOSIS — R42 VERTIGO: Primary | ICD-10-CM

## 2024-07-12 NOTE — TELEPHONE ENCOUNTER
Caller: Payal Eldridge I    Relationship: Self    Best call back number: [phone]     What orders are you requesting (i.e. lab or imaging): CT SCAN     In what timeframe would the patient need to come in: ASAP    Additional notes: STATED HER VERTIGO SYMPTOMS HAVE NOT IMPROVED AND WOULD LIKE FURTHER TESTING DONE.

## 2024-07-16 ENCOUNTER — TELEPHONE (OUTPATIENT)
Dept: FAMILY MEDICINE CLINIC | Facility: CLINIC | Age: 59
End: 2024-07-16
Payer: COMMERCIAL

## 2024-07-16 DIAGNOSIS — R00.2 PALPITATIONS: ICD-10-CM

## 2024-07-16 DIAGNOSIS — R42 VERTIGO: Primary | ICD-10-CM

## 2024-07-16 NOTE — TELEPHONE ENCOUNTER
Caller: Payal Eldridge    Relationship: Self    Best call back number: [phone]     What specialty or service is being requested: CARDIOLOGY    What is the provider, practice or medical service name: [facility]    Any additional details: THEIR OFFICE FAX .[phone]   I have endorsed this patient to the incoming physician, including clinical history, physical exam, current results and assessment/plan. Fall, 6 steps, pt with right sided lower back pain, ecchymoses.  Plan : ct a/p (after pretreatment with steroids/benadryl given allergy profile).  Please f/u results of CT.

## 2024-07-20 ENCOUNTER — APPOINTMENT (OUTPATIENT)
Dept: GENERAL RADIOLOGY | Facility: HOSPITAL | Age: 59
End: 2024-07-20
Payer: COMMERCIAL

## 2024-07-20 ENCOUNTER — APPOINTMENT (OUTPATIENT)
Dept: CT IMAGING | Facility: HOSPITAL | Age: 59
End: 2024-07-20
Payer: COMMERCIAL

## 2024-07-20 ENCOUNTER — HOSPITAL ENCOUNTER (EMERGENCY)
Facility: HOSPITAL | Age: 59
Discharge: HOME | End: 2024-07-20
Attending: EMERGENCY MEDICINE
Payer: COMMERCIAL

## 2024-07-20 VITALS
HEART RATE: 61 BPM | TEMPERATURE: 98.1 F | RESPIRATION RATE: 16 BRPM | SYSTOLIC BLOOD PRESSURE: 148 MMHG | DIASTOLIC BLOOD PRESSURE: 74 MMHG | BODY MASS INDEX: 25.44 KG/M2 | WEIGHT: 149.03 LBS | HEIGHT: 64 IN | OXYGEN SATURATION: 98 %

## 2024-07-20 DIAGNOSIS — R42 VERTIGO: ICD-10-CM

## 2024-07-20 DIAGNOSIS — R42 DIZZINESS: Primary | ICD-10-CM

## 2024-07-20 LAB
ALBUMIN SERPL-MCNC: 4.7 G/DL (ref 3.5–5.2)
ALBUMIN/GLOB SERPL: 1.9 G/DL
ALP SERPL-CCNC: 59 U/L (ref 39–117)
ALT SERPL W P-5'-P-CCNC: 16 U/L (ref 1–33)
ANION GAP SERPL CALCULATED.3IONS-SCNC: 10 MMOL/L (ref 5–15)
AST SERPL-CCNC: 21 U/L (ref 1–32)
BASOPHILS # BLD AUTO: 0.03 10*3/MM3 (ref 0–0.2)
BASOPHILS NFR BLD AUTO: 0.6 % (ref 0–1.5)
BILIRUB SERPL-MCNC: 0.5 MG/DL (ref 0–1.2)
BILIRUB UR QL STRIP: NEGATIVE
BUN SERPL-MCNC: 19 MG/DL (ref 8–23)
BUN/CREAT SERPL: 17.6 (ref 7–25)
CALCIUM SPEC-SCNC: 9.8 MG/DL (ref 8.6–10.5)
CHLORIDE SERPL-SCNC: 103 MMOL/L (ref 98–107)
CLARITY UR: CLEAR
CO2 SERPL-SCNC: 27 MMOL/L (ref 22–29)
COLOR UR: YELLOW
CREAT SERPL-MCNC: 1.08 MG/DL (ref 0.57–1)
DEPRECATED RDW RBC AUTO: 41.7 FL (ref 37–54)
EGFRCR SERPLBLD CKD-EPI 2021: 58.9 ML/MIN/1.73
EOSINOPHIL # BLD AUTO: 0.07 10*3/MM3 (ref 0–0.4)
EOSINOPHIL NFR BLD AUTO: 1.3 % (ref 0.3–6.2)
ERYTHROCYTE [DISTWIDTH] IN BLOOD BY AUTOMATED COUNT: 13.1 % (ref 12.3–15.4)
GLOBULIN UR ELPH-MCNC: 2.5 GM/DL
GLUCOSE SERPL-MCNC: 99 MG/DL (ref 65–99)
GLUCOSE UR STRIP-MCNC: NEGATIVE MG/DL
HCT VFR BLD AUTO: 43 % (ref 34–46.6)
HGB BLD-MCNC: 14.3 G/DL (ref 12–15.9)
HGB UR QL STRIP.AUTO: NEGATIVE
HOLD SPECIMEN: NORMAL
HOLD SPECIMEN: NORMAL
IMM GRANULOCYTES # BLD AUTO: 0.01 10*3/MM3 (ref 0–0.05)
IMM GRANULOCYTES NFR BLD AUTO: 0.2 % (ref 0–0.5)
KETONES UR QL STRIP: NEGATIVE
LEUKOCYTE ESTERASE UR QL STRIP.AUTO: NEGATIVE
LYMPHOCYTES # BLD AUTO: 1.19 10*3/MM3 (ref 0.7–3.1)
LYMPHOCYTES NFR BLD AUTO: 21.8 % (ref 19.6–45.3)
MAGNESIUM SERPL-MCNC: 2.2 MG/DL (ref 1.6–2.4)
MCH RBC QN AUTO: 29.2 PG (ref 26.6–33)
MCHC RBC AUTO-ENTMCNC: 33.3 G/DL (ref 31.5–35.7)
MCV RBC AUTO: 87.9 FL (ref 79–97)
MONOCYTES # BLD AUTO: 0.36 10*3/MM3 (ref 0.1–0.9)
MONOCYTES NFR BLD AUTO: 6.6 % (ref 5–12)
NEUTROPHILS NFR BLD AUTO: 3.79 10*3/MM3 (ref 1.7–7)
NEUTROPHILS NFR BLD AUTO: 69.5 % (ref 42.7–76)
NITRITE UR QL STRIP: NEGATIVE
NRBC BLD AUTO-RTO: 0 /100 WBC (ref 0–0.2)
PH UR STRIP.AUTO: 7.5 [PH] (ref 5–8)
PLATELET # BLD AUTO: 208 10*3/MM3 (ref 140–450)
PMV BLD AUTO: 10.7 FL (ref 6–12)
POTASSIUM SERPL-SCNC: 4 MMOL/L (ref 3.5–5.2)
PROT SERPL-MCNC: 7.2 G/DL (ref 6–8.5)
PROT UR QL STRIP: NEGATIVE
RBC # BLD AUTO: 4.89 10*6/MM3 (ref 3.77–5.28)
SODIUM SERPL-SCNC: 140 MMOL/L (ref 136–145)
SP GR UR STRIP: >1.03 (ref 1–1.03)
TROPONIN T SERPL HS-MCNC: <6 NG/L
UROBILINOGEN UR QL STRIP: ABNORMAL
WBC NRBC COR # BLD AUTO: 5.45 10*3/MM3 (ref 3.4–10.8)
WHOLE BLOOD HOLD COAG: NORMAL
WHOLE BLOOD HOLD SPECIMEN: NORMAL

## 2024-07-20 PROCEDURE — 80053 COMPREHEN METABOLIC PANEL: CPT | Performed by: EMERGENCY MEDICINE

## 2024-07-20 PROCEDURE — 71045 X-RAY EXAM CHEST 1 VIEW: CPT

## 2024-07-20 PROCEDURE — 83735 ASSAY OF MAGNESIUM: CPT | Performed by: EMERGENCY MEDICINE

## 2024-07-20 PROCEDURE — 85025 COMPLETE CBC W/AUTO DIFF WBC: CPT

## 2024-07-20 PROCEDURE — 36415 COLL VENOUS BLD VENIPUNCTURE: CPT

## 2024-07-20 PROCEDURE — 70450 CT HEAD/BRAIN W/O DYE: CPT

## 2024-07-20 PROCEDURE — 84484 ASSAY OF TROPONIN QUANT: CPT | Performed by: EMERGENCY MEDICINE

## 2024-07-20 PROCEDURE — 99285 EMERGENCY DEPT VISIT HI MDM: CPT

## 2024-07-20 PROCEDURE — 70496 CT ANGIOGRAPHY HEAD: CPT

## 2024-07-20 PROCEDURE — 25510000001 IOPAMIDOL PER 1 ML: Performed by: EMERGENCY MEDICINE

## 2024-07-20 PROCEDURE — 96360 HYDRATION IV INFUSION INIT: CPT

## 2024-07-20 PROCEDURE — 25810000003 SODIUM CHLORIDE 0.9 % SOLUTION: Performed by: EMERGENCY MEDICINE

## 2024-07-20 PROCEDURE — 70498 CT ANGIOGRAPHY NECK: CPT

## 2024-07-20 PROCEDURE — 81003 URINALYSIS AUTO W/O SCOPE: CPT | Performed by: EMERGENCY MEDICINE

## 2024-07-20 RX ORDER — SODIUM CHLORIDE 0.9 % (FLUSH) 0.9 %
10 SYRINGE (ML) INJECTION AS NEEDED
Status: DISCONTINUED | OUTPATIENT
Start: 2024-07-20 | End: 2024-07-20 | Stop reason: HOSPADM

## 2024-07-20 RX ADMIN — IOPAMIDOL 100 ML: 755 INJECTION, SOLUTION INTRAVENOUS at 12:14

## 2024-07-20 RX ADMIN — SODIUM CHLORIDE 1000 ML: 9 INJECTION, SOLUTION INTRAVENOUS at 10:44

## 2024-07-20 NOTE — ED PROVIDER NOTES
Time: 10:39 AM EDT  Date of encounter:  7/20/2024  Independent Historian/Clinical History and Information was obtained by:   Patient    History is limited by: N/A    Chief Complaint: Dizziness      History of Present Illness:  Patient is a 59 y.o. year old female who presents to the emergency department for evaluation of dizziness.  Reports that she has had dizziness has been ongoing for several weeks now.  States is worse when she lays down or changes positions from laying down to standing up.  She states that she is okay to move when she is sitting to standing but has difficulty when she is laying down.  She also reports that she gets dizzy when she looks to the left.  States that it is feels like someone is turning her head on the inside.  States that she saw her doctor for this and was diagnosed with vertigo.  Is due for head CT later this month.  States it continues to get worse but she also noticed that she was having numbness today.  Reports that she is having numbness on the left side of her face.  Denies any numbness or tingling down the arms or legs.  No other complaints this time.    HPI    Patient Care Team  Primary Care Provider: Almas Madsen MD    Past Medical History:     No Known Allergies  Past Medical History:   Diagnosis Date    Abnormal vaginal Pap smear     dr. marroquin    Allergies     Anxiety     Cardiac dysrhythmia     sees Dr. Villalpando    Costochondritis     sees Dr. Villalpando    Endometriosis of uterus     History of cancer chemotherapy     melanoma on abdomen excised. see dermatologist yearly     Hx of migraine headaches     Hypothyroidism     Irritable bowel syndrome     sees GI doctor    Melanoma     Screening mammogram, encounter for 04/2019     Past Surgical History:   Procedure Laterality Date    CHOLECYSTECTOMY      COLONOSCOPY  2017    COLONOSCOPY N/A 7/26/2021    Procedure: COLONOSCOPY;  Surgeon: Rico Conner MD;  Location: [facility] ENDOSCOPY;  Service: Gastroenterology;  Laterality:  "N/A;  hemorrhoids    DILATATION AND CURETTAGE      ENDOSCOPY N/A 7/26/2021    Procedure: ESOPHAGOGASTRODUODENOSCOPY;  Surgeon: Rico Conner MD;  Location: [facility] ENDOSCOPY;  Service: Gastroenterology;  Laterality: N/A;  normal egd    HYSTERECTOMY      LASIK      TUBAL ABDOMINAL LIGATION       Family History   Problem Relation Age of Onset    Colon cancer Father 53        Dad diadnosed at 53    Cancer Father         Coloncancer    Stroke Maternal Grandmother     Cancer Sister         Breastcancer    Diabetes Sister     Malig Hyperthermia Neg Hx        Home Medications:  Prior to Admission medications    Medication Sig Start Date End Date Taking? Authorizing Provider   Collagenase powder  1/1/22   Jennifer Javier MD   levothyroxine (SYNTHROID, LEVOTHROID) 125 MCG tablet TAKE 1 TABLET BY MOUTH DAILY 6/24/24   Almas Madsen MD   meclizine (ANTIVERT) 25 MG tablet Take 1 tablet by mouth 3 (Three) Times a Day As Needed for Dizziness. 7/9/24   Almas Madsen MD   multivitamin with minerals tablet tablet multivitamin Oral Tablet take 1 tablet by oral route once daily with food   Suspended    Jennifer Javier MD   Nutritional Supplements (OSTEO ADVANCE PO)  1/1/21   Jennifer Javier MD   predniSONE (DELTASONE) 20 MG tablet Take 1 tablet by mouth 2 (Two) Times a Day. 7/9/24   Almas Madsen MD        Social History:   Social History     Tobacco Use    Smoking status: Never    Smokeless tobacco: Never   Vaping Use    Vaping status: Never Used   Substance Use Topics    Alcohol use: Yes     Alcohol/week: 5.0 standard drinks of alcohol     Types: 2 Glasses of wine, 2 Cans of beer, 1 Drinks containing 0.5 oz of alcohol per week     Comment: Drink on weekends if I go out.    Drug use: Never         Review of Systems:  Review of Systems   Neurological:  Positive for dizziness and numbness.        Physical Exam:  /74   Pulse 61   Temp 98.1 °F (36.7 °C) (Oral)   Resp 16   Ht 162.6 cm (64\")   Wt 67.6 " kg (149 lb 0.5 oz)   SpO2 98%   BMI 25.58 kg/m²     Physical Exam  Vitals and nursing note reviewed.   Constitutional:       Appearance: Normal appearance.   HENT:      Head: Normocephalic and atraumatic.   Eyes:      General: No scleral icterus.  Cardiovascular:      Rate and Rhythm: Normal rate and regular rhythm.      Heart sounds: Normal heart sounds.   Pulmonary:      Effort: Pulmonary effort is normal.      Breath sounds: Normal breath sounds.   Abdominal:      Palpations: Abdomen is soft.      Tenderness: There is no abdominal tenderness.   Musculoskeletal:         General: Normal range of motion.      Cervical back: Normal range of motion.   Skin:     Findings: No rash.   Neurological:      General: No focal deficit present.      Mental Status: She is alert and oriented to person, place, and time.      Cranial Nerves: No cranial nerve deficit.      Sensory: Sensory deficit present.      Motor: No weakness.      Comments: Patient reports facial numbness including the forehead and left side of the face.  There is no deficits otherwise noted.                  Procedures:  Procedures      Medical Decision Making:      Comorbidities that affect care:    Hypertension    External Notes reviewed:    Reviewed PCP note from 7/9/2024      The following orders were placed and all results were independently analyzed by me:  Orders Placed This Encounter   Procedures    XR Chest 1 View    CT Head Without Contrast    CT Angiogram Head    CT Angiogram Neck    [place] Draw    Comprehensive Metabolic Panel    Single High Sensitivity Troponin T    Magnesium    Urinalysis With Microscopic If Indicated (No Culture) - Urine, Clean Catch    CBC Auto Differential    NPO Diet NPO Type: Strict NPO    Undress & Gown    Continuous Pulse Oximetry    Vital Signs    Orthostatic Blood Pressure    Oxygen Therapy- Nasal Cannula; Titrate 1-6 LPM Per SpO2; 90 - 95%    POC Glucose Once    ECG 12 Lead ED Triage Standing Order; Weak / Dizzy /  AMS    Insert Peripheral IV    Fall Precautions    CBC & Differential    Green Top (Gel)    Lavender Top    Gold Top - SST    Light Blue Top       Medications Given in the Emergency Department:  Medications   sodium chloride 0.9 % flush 10 mL (has no administration in time range)   sodium chloride 0.9 % bolus 1,000 mL (0 mL Intravenous Stopped 7/20/24 1144)   iopamidol (ISOVUE-370) 76 % injection 100 mL (100 mL Intravenous Given 7/20/24 1214)        ED Course:         Labs:    Lab Results (last 24 hours)       Procedure Component Value Units Date/Time    CBC & Differential [[id number]]  (Normal) Collected: 07/20/24 0957    Specimen: Blood from Arm, Right Updated: 07/20/24 1027    Narrative:      The following orders were created for panel order CBC & Differential.  Procedure                               Abnormality         Status                     ---------                               -----------         ------                     CBC Auto Differential[[id number]]        Normal              Final result                 Please view results for these tests on the individual orders.    Comprehensive Metabolic Panel [[id number]]  (Abnormal) Collected: 07/20/24 0957    Specimen: Blood from Arm, Right Updated: 07/20/24 1048     Glucose 99 mg/dL      BUN 19 mg/dL      Creatinine 1.08 mg/dL      Sodium 140 mmol/L      Potassium 4.0 mmol/L      Chloride 103 mmol/L      CO2 27.0 mmol/L      Calcium 9.8 mg/dL      Total Protein 7.2 g/dL      Albumin 4.7 g/dL      ALT (SGPT) 16 U/L      AST (SGOT) 21 U/L      Alkaline Phosphatase 59 U/L      Total Bilirubin 0.5 mg/dL      Globulin 2.5 gm/dL      A/G Ratio 1.9 g/dL      BUN/Creatinine Ratio 17.6     Anion Gap 10.0 mmol/L      eGFR 58.9 mL/min/1.73     Narrative:      GFR Normal >60  Chronic Kidney Disease <60  Kidney Failure <15      Single High Sensitivity Troponin T [[id number]]  (Normal) Collected: 07/20/24 0957    Specimen: Blood from Arm, Right Updated: 07/20/24 1047      HS Troponin T <6 ng/L     Narrative:      High Sensitive Troponin T Reference Range:  <14.0 ng/L- Negative Female for AMI  <22.0 ng/L- Negative Male for AMI  >=14 - Abnormal Female indicating possible myocardial injury.  >=22 - Abnormal Male indicating possible myocardial injury.   Clinicians would have to utilize clinical acumen, EKG, Troponin, and serial changes to determine if it is an Acute Myocardial Infarction or myocardial injury due to an underlying chronic condition.         Magnesium [[id number]]  (Normal) Collected: 07/20/24 0957    Specimen: Blood from Arm, Right Updated: 07/20/24 1048     Magnesium 2.2 mg/dL     CBC Auto Differential [[id number]]  (Normal) Collected: 07/20/24 0957    Specimen: Blood from Arm, Right Updated: 07/20/24 1027     WBC 5.45 10*3/mm3      RBC 4.89 10*6/mm3      Hemoglobin 14.3 g/dL      Hematocrit 43.0 %      MCV 87.9 fL      MCH 29.2 pg      MCHC 33.3 g/dL      RDW 13.1 %      RDW-SD 41.7 fl      MPV 10.7 fL      Platelets 208 10*3/mm3      Neutrophil % 69.5 %      Lymphocyte % 21.8 %      Monocyte % 6.6 %      Eosinophil % 1.3 %      Basophil % 0.6 %      Immature Grans % 0.2 %      Neutrophils, Absolute 3.79 10*3/mm3      Lymphocytes, Absolute 1.19 10*3/mm3      Monocytes, Absolute 0.36 10*3/mm3      Eosinophils, Absolute 0.07 10*3/mm3      Basophils, Absolute 0.03 10*3/mm3      Immature Grans, Absolute 0.01 10*3/mm3      nRBC 0.0 /100 WBC     Urinalysis With Microscopic If Indicated (No Culture) - Urine, Clean Catch [[id number]]  (Abnormal) Collected: 07/20/24 1345    Specimen: Urine, Clean Catch Updated: 07/20/24 1351     Color, UA Yellow     Appearance, UA Clear     pH, UA 7.5     Specific Gravity, UA >1.030     Glucose, UA Negative     Ketones, UA Negative     Bilirubin, UA Negative     Blood, UA Negative     Protein, UA Negative     Leuk Esterase, UA Negative     Nitrite, UA Negative     Urobilinogen, UA 0.2 E.U./dL    Narrative:      Urine microscopic not indicated.              Imaging:    CT Angiogram Head    Result Date: 7/20/2024  CT ANGIOGRAM HEAD, CT ANGIOGRAM NECK Date of Exam: 7/20/2024 11:59 AM EDT Indication: Dizziness. Comparison: Noncontrast head CT from the same date Technique: CTA of the head was performed after the uneventful intravenous administration of iodinated contrast. Reconstructed coronal and sagittal images were also obtained. In addition, a 3-D volume rendered image was created for interpretation. Automated exposure control and iterative reconstruction methods were used. FINDINGS: Vascular Findings: The right common carotid, internal carotid, middle cerebral, anterior cerebral, vertebral, and posterior cerebral arteries are patent without abrupt cut off or aneurysmal dilation. The left common carotid, internal carotid, middle cerebral, anterior cerebral, vertebral, and posterior cerebral arteries are patent without abrupt cut off or aneurysmal dilation. There is fetal origin of the left posterior cerebral artery. Basilar artery appears patent and appears unremarkable. Non-vascular Findings: For description of nonvascular intracranial findings, please refer to the noncontrast head CT performed the same date. No acute abnormality is identified within the visualized soft tissue or bony structures of the neck. The visualized lung apices are clear.     1.No acute abnormality is identified within the large arteries of the head or neck. 2.No significant stenosis of the bilateral internal carotid arteries. Electronically Signed: Oni Leslie MD  7/20/2024 2:08 PM EDT  Workstation ID: [id number]    CT Angiogram Neck    Result Date: 7/20/2024  CT ANGIOGRAM HEAD, CT ANGIOGRAM NECK Date of Exam: 7/20/2024 11:59 AM EDT Indication: Dizziness. Comparison: Noncontrast head CT from the same date Technique: CTA of the head was performed after the uneventful intravenous administration of iodinated contrast. Reconstructed coronal and sagittal images were also obtained. In  addition, a 3-D volume rendered image was created for interpretation. Automated exposure control and iterative reconstruction methods were used. FINDINGS: Vascular Findings: The right common carotid, internal carotid, middle cerebral, anterior cerebral, vertebral, and posterior cerebral arteries are patent without abrupt cut off or aneurysmal dilation. The left common carotid, internal carotid, middle cerebral, anterior cerebral, vertebral, and posterior cerebral arteries are patent without abrupt cut off or aneurysmal dilation. There is fetal origin of the left posterior cerebral artery. Basilar artery appears patent and appears unremarkable. Non-vascular Findings: For description of nonvascular intracranial findings, please refer to the noncontrast head CT performed the same date. No acute abnormality is identified within the visualized soft tissue or bony structures of the neck. The visualized lung apices are clear.     1.No acute abnormality is identified within the large arteries of the head or neck. 2.No significant stenosis of the bilateral internal carotid arteries. Electronically Signed: Oni Leslie MD  7/20/2024 2:08 PM EDT  Workstation ID: [id number]    CT Head Without Contrast    Result Date: 7/20/2024  CT HEAD WO CONTRAST Date of Exam: 7/20/2024 12:00 PM EDT Indication: dizziness headache. Comparison: None available. Technique: Axial CT images were obtained of the head without contrast administration.  Reconstructed coronal and sagittal images were also obtained. Automated exposure control and iterative construction methods were used. FINDINGS:  The brain parenchyma appears unremarkable in volume and morphology.  No significant mass effect, midline shift, intracranial hemorrhage, or hydrocephalus is identified. No extra-axial fluid collection is identified.   The calvarium and overlying soft tissues are unremarkable. The paranasal sinuses and bilateral mastoid air cells are adequately aerated. The  visualized bony orbits, globes, and retrobulbar soft tissues are unremarkable.     1.No acute intracranial abnormality is identified. Electronically Signed: Oni Leslie MD  7/20/2024 12:16 PM EDT  Workstation ID: [id number]    XR Chest 1 View    Result Date: 7/20/2024  XR CHEST 1 VW Date of Exam: 7/20/2024 10:14 AM EDT Indication: Weak/Dizzy/AMS triage protocol Comparison: 9/13/2021 FINDINGS: The lungs are well-expanded. The heart and pulmonary vasculature are within normal limits. No pleural effusions are identified. There are no active appearing infiltrates. No evidence of pneumothorax. There is a 2 cm density projecting over the right humeral head.     1. No active disease. 2. 2 cm density projecting over the right humeral head possibly a bone island or calcific tendinitis of the rotator cuff. Electronically Signed: Lewis Curran MD  7/20/2024 10:41 AM EDT  Workstation ID: [id number]       Differential Diagnosis and Discussion:    Dizziness: Based on the patient's history, signs, and symptoms, the diffential diagnosis includes but is not limited to meningitis, stroke, sepsis, subarachnoid hemorrhage, intracranial bleeding, encephalitis, vertigo, electrolyte imbalance, and metabolic disorders.    All labs were reviewed and interpreted by me.  CT scan radiology impression was interpreted by me.    MDM     Amount and/or Complexity of Data Reviewed  Clinical lab tests: reviewed  Tests in the radiology section of CPT®: reviewed       Patient is a 59-year-old female who presents with complaints of dizziness.  Reports has been ongoing for several weeks.  Worse with positional changes.  On exam she reports dizziness worse when she turns her head to the left.  She also reports numbness to the left side portion of the forehead as well as some of the left cheek.  This does not involve the eye or her lips.  She also reports that it goes down her left neck.  This does not appear to be in the same distribution for possible  stroke.  CT and CTAs were done to rule out any vascular issues as well as a stroke.  Is no signs of vascular issues or stroke on exam or on CT.  I do not think she needs an MRI at this time due to the ongoing nature of this as well as a CT that does not show any acute findings.  Should discontinue she will likely need to be followed up with ear nose and throat as well as her primary care doctor.  She may need an MRI in the future if she does not have any resolution.  At this time I think she is okay for outpatient follow-up.  Will DC and have patient follow-up as outpatient.          Patient Care Considerations:          Consultants/Shared Management Plan:    None    Social Determinants of Health:    Patient is independent, reliable, and has access to care.       Disposition and Care Coordination:    Discharged: I considered escalation of care by admitting this patient to the hospital, however this does not appear to be related to a stroke    I have explained the patient´s condition, diagnoses and treatment plan based on the information available to me at this time. I have answered questions and addressed any concerns. The patient has a good  understanding of the patient´s diagnosis, condition, and treatment plan as can be expected at this point. The vital signs have been stable. The patient´s condition is stable and appropriate for discharge from the emergency department.      The patient will pursue further outpatient evaluation with the primary care physician or other designated or consulting physician as outlined in the discharge instructions. They are agreeable to this plan of care and follow-up instructions have been explained in detail. The patient has received these instructions in written format and have expressed an understanding of the discharge instructions. The patient is aware that any significant change in condition or worsening of symptoms should prompt an immediate return to this or the closest  emergency department or call to 911.      Final diagnoses:   Dizziness   Vertigo        ED Disposition       ED Disposition   Discharge    Condition   Stable    Comment   --               This medical record created using voice recognition software.             Donnell Ladd MD  07/20/24 8300

## 2024-08-21 ENCOUNTER — TREATMENT (OUTPATIENT)
Dept: PHYSICAL THERAPY | Facility: CLINIC | Age: 59
End: 2024-08-21
Payer: COMMERCIAL

## 2024-08-21 DIAGNOSIS — H81.12 BPPV (BENIGN PAROXYSMAL POSITIONAL VERTIGO), LEFT: ICD-10-CM

## 2024-08-21 DIAGNOSIS — H81.10 BENIGN POSITIONAL PAROXYSMAL VERTIGO INVOLVING POSTERIOR CANAL: ICD-10-CM

## 2024-08-21 DIAGNOSIS — R42 DIZZINESS: Primary | ICD-10-CM

## 2024-08-21 NOTE — PROGRESS NOTES
"  Physical Therapy Initial Evaluation and Discharge                    [facility] [address] [facility] [place] [address]    Patient: Payal Eldridge   : 1965  Diagnosis/ICD-10 Code:  Dizziness [R42]  Referring practitioner: Almas Madsen MD  Date of Initial Visit: 2024  Today's Date: 2024  Patient seen for 1 sessions           Subjective Questionnaire: DHI: 16 (mild handicap)      Subjective Evaluation    History of Present Illness  Mechanism of injury: Pt reports to therapy with dizziness. On July the 2024, she tried to get up and her \"brain felt like it was spinning in her head.\" It is getting better, but it is still not normal. After she gets up, she is fine.   4 weeks ago, she woke up and the L side of her face was numb and tingling. She went to the ER and they did a CT scan. The CT scan was normal. This sensation went away after 24 hours.     Her problem is laying down, looking up and looking to the L side. If she tries to turn over onto her left side everything starts spinning. The dizziness lasts 30 seconds to 1 minute. It has gotten a lot better since it started.    She has an appointment with the ENT on 2024.     PMH: melanoma     Pain  Aggravating factors: movement, overhead activity, prolonged positioning and sleeping    Patient Goals  Patient goals for therapy: improved balance, increased motion, independence with ADLs/IADLs and return to sport/leisure activities             Objective     VA Test: negative B   L DH: negative  R DH: negative  L Roll Test: negative  R Roll Test: negative    SP horizontal: normal  SP vertical: normal except for saccadic movements with looking down   VORx1: normal- a little dizzy afterwards     Pt did get dizzy upon sitting up too fast from supine, however, this was more of a \"wooziness\" than a spinning.     See Exercise, Manual, and Modality Logs for complete treatment.     Assessment & Plan " "      Assessment  Assessment details: Pt reports to therapy with complaints of dizziness that started on 24. Pt's dizziness has since improved significantly. Based on subjective information and results of objective testing, PT assumes that pt did have L PC BPPV but that it resolved on it's own and now pt is suffering from residual effects and fear-avoidance. All testing for BPPV was negative today, indicating that pt does not currently have BPPV. Pt was given vestibular exercises to perform at home to help her over come the fear-avoidance and residual \"wooziness\" from the BPPV. Pt was given info sheet about BPPV and a handout for how to perform the L Epley maneuver in case the BPPV returns. Pt will not be seen for therapy at this time, as the dizziness has already improved significantly. Pt will be discharged at this time.     Plan  Therapy options: will not be seen for skilled therapy services  Treatment plan discussed with: patient        Visit Diagnoses:    ICD-10-CM ICD-9-CM   1. Dizziness  R42 780.4   2. BPPV (benign paroxysmal positional vertigo), left  H81.12 386.11   3. Benign positional paroxysmal vertigo involving posterior canal  H81.10 386.11       History # of Personal Factors and/or Comorbidities: LOW (0)  Examination of Body System(s): # of elements: LOW (1-2)  Clinical Presentation: STABLE   Clinical Decision Making: LOW       Timed:         Manual Therapy:    0     mins  48172;     Therapeutic Exercise:    0     mins  70860;     Neuromuscular Ria:    0    mins  40596;    Therapeutic Activity:     8     mins  51017;     Gait Trainin     mins  36285;     Ultrasound:     0     mins  71220;    Ionto                               0    mins   24715  Self Care                       0     mins   84230  Canalith Repos    0     mins 04632      Un-Timed:  Electrical Stimulation:    0     mins  22609 ( );  Dry Needling     0     mins self-pay  Traction     0     mins 45049  Low Eval     30  "    Mins  41021  Mod Eval     0     Mins  10673  High Eval                       0     Mins  66029  Re-Eval                           0    mins  65457    Timed Treatment:   8   mins   Total Treatment:     38   mins    PT SIGNATURE: Lorrie Arriola PT    Electronically signed 8/21/2024    [place] License: PT - [id number]      Initial Certification  Certification Period: 8/21/2024 thru 11/18/2024  I certify that the therapy services are furnished while this patient is under my care.  The services outlined above are required by this patient, and will be reviewed every 90 days.     PHYSICIAN: Almas Madsen MD  NPI: [id number]      DATE:     Please sign and return via fax to [phone]. Thank you, [facility].

## 2024-09-10 PROBLEM — R00.2 PALPITATIONS: Status: ACTIVE | Noted: 2024-09-10

## 2024-09-10 NOTE — PROGRESS NOTES
Chief Complaint  Establish Care, Palpitations, and Dizziness      History of Present Illness  Payal Eldridge presents to [facility]  Patient is a 59-year-old female with a previous history of heart palpitations who is now developed dizziness episodes since about July.  The initial episode occurred when she stood up felt like the room was spinning when she sat back down the symptoms h got worse she did get some relief with turning her head to the right.  The patient has had persistent issues since then but is getting better with a positional component.  She denies any syncope or presyncopal episode.  She reports heart palpitations which are stable in nature no sustained tachycardic problems    Past Medical History:   Diagnosis Date    Abnormal vaginal Pap smear     dr. marroquin    Allergies     Anxiety     Cardiac dysrhythmia     sees Dr. Villalpando    Costochondritis     sees Dr. Villalpando    Endometriosis of uterus     History of cancer chemotherapy     melanoma on abdomen excised. see dermatologist yearly     Hx of migraine headaches     Hypothyroidism     Irritable bowel syndrome     sees GI doctor    Melanoma     Screening mammogram, encounter for 04/2019         Current Outpatient Medications:     Collagenase powder, , Disp: , Rfl:     estradiol (MINIVELLE, VIVELLE-DOT) 0.05 MG/24HR patch, Place 1 patch on the skin as directed by provider 1 (One) Time Per Week., Disp: , Rfl:     levothyroxine (SYNTHROID, LEVOTHROID) 125 MCG tablet, TAKE 1 TABLET BY MOUTH DAILY, Disp: 90 tablet, Rfl: 0    multivitamin with minerals tablet tablet, multivitamin Oral Tablet take 1 tablet by oral route once daily with food   Suspended, Disp: , Rfl:     meclizine (ANTIVERT) 25 MG tablet, Take 1 tablet by mouth 3 (Three) Times a Day As Needed for Dizziness. (Patient not taking: Reported on 9/11/2024), Disp: 20 tablet, Rfl: 0    Nutritional Supplements (OSTEO ADVANCE PO), , Disp: , Rfl:     There are no  "discontinued medications.  No Known Allergies     Social History     Tobacco Use    Smoking status: Never    Smokeless tobacco: Never   Vaping Use    Vaping status: Never Used   Substance Use Topics    Alcohol use: Yes     Alcohol/week: 5.0 standard drinks of alcohol     Types: 2 Glasses of wine, 2 Cans of beer, 1 Drinks containing 0.5 oz of alcohol per week     Comment: Drink on weekends if I go out.    Drug use: Never       Family History   Problem Relation Age of Onset    Colon cancer Father 53        Dad diadnosed at 53    Cancer Father         Coloncancer    Stroke Maternal Grandmother     Cancer Sister         Breastcancer    Diabetes Sister     Malig Hyperthermia Neg Hx         Objective     /72   Pulse 62   Ht 162.6 cm (64\")   Wt 68 kg (150 lb)   BMI 25.75 kg/m²       Physical Exam    General Appearance:   no acute distress  Alert and oriented x3  HENT:   lips not cyanotic  Atraumatic   Neck:  No jvd   supple  Respiratory:  no respiratory distress  normal breath sounds  no rales  Cardiovascular:  RRR  no S3, no S4   no murmur  no rub  Extremities  No cyanosis  lower extremity edema: none    Skin:   warm, dry  No rashes    Result Review :     No results found for: \"PROBNP\"  CMP          5/3/2024    07:54 7/20/2024    09:57   CMP   Glucose 93  99    BUN 16  19    Creatinine 1.04  1.08    EGFR 61.7  58.9    Sodium 142  140    Potassium 4.3  4.0    Chloride 105  103    Calcium 9.6  9.8    Total Protein 7.4  7.2    Albumin 4.9  4.7    Globulin 2.5  2.5    Total Bilirubin 0.4  0.5    Alkaline Phosphatase 57  59    AST (SGOT) 16  21    ALT (SGPT) 19  16    Albumin/Globulin Ratio 2.0  1.9    BUN/Creatinine Ratio 15.4  17.6    Anion Gap 9.0  10.0      CBC w/diff          5/3/2024    07:54 7/20/2024    09:57   CBC w/Diff   WBC 6.20  5.45    RBC 5.02  4.89    Hemoglobin 14.8  14.3    Hematocrit 43.8  43.0    MCV 87.3  87.9    MCH 29.5  29.2    MCHC 33.8  33.3    RDW 13.1  13.1    Platelets 203  208  " "  Neutrophil Rel % 68.3  69.5    Immature Granulocyte Rel % 0.2  0.2    Lymphocyte Rel % 21.9  21.8    Monocyte Rel % 7.1  6.6    Eosinophil Rel % 1.9  1.3    Basophil Rel % 0.6  0.6       Lab Results   Component Value Date    TSH 0.308 05/03/2024      Lab Results   Component Value Date    FREET4 1.86 (H) 09/16/2022      No results found for: \"DDIMERQUANT\"  Magnesium   Date Value Ref Range Status   07/20/2024 2.2 1.6 - 2.4 mg/dL Final      No results found for: \"DIGOXIN\"   Lab Results   Component Value Date    TROPONINT <6 07/20/2024      No results found for: \"POCTROP\"(       Lipid Panel          5/3/2024    07:54   Lipid Panel   Total Cholesterol 195    Triglycerides 49    HDL Cholesterol 89    VLDL Cholesterol 9    LDL Cholesterol  97    LDL/HDL Ratio 1.08           ECG 12 Lead    Date/Time: 9/11/2024 9:57 AM  Performed by: Mesfin Villalpando MD    Authorized by: Mesfin Villalpando MD  Comparison: compared with previous ECG   Similar to previous ECG  Rhythm: sinus rhythm  Other findings: low voltage  Comments: Possible anterior septal infarct old         No results found for this or any previous visit.                   The 10-year ASCVD risk score (Kate DK, et al., 2019) is: 2.7%    Values used to calculate the score:      Age: 59 years      Sex: Female      Is Non- : No      Diabetic: No      Tobacco smoker: No      Systolic Blood Pressure: 137 mmHg      Is BP treated: No      HDL Cholesterol: 89 mg/dL      Total Cholesterol: 195 mg/dL     Diagnoses and all orders for this visit:    1. Dizziness (Primary)  Assessment & Plan:  Patient with dizziness issues clinically very symptomatic with vertigo no significant changes on EKG patient does not have a clear-cut positional component would make me think that she has been orthostatic as well.  Do not feel cardiac dysrhythmias or likely the case as she has not had any previous bradycardic problems and no change on EKG would not recommend further " testing at this point      2. Palpitations  Assessment & Plan:  Patient with stable symptomatic heart palpitations from benign PACs/PVCs no further medical treatment needed from a symptom standpoint at this point.  Her EKG remained stable      Other orders  -     ECG 12 Lead            Follow Up     No follow-ups on file.          Patient was given instructions and counseling regarding her condition or for health maintenance advice. Please see specific information pulled into the AVS if appropriate.

## 2024-09-11 ENCOUNTER — OFFICE VISIT (OUTPATIENT)
Dept: CARDIOLOGY | Facility: CLINIC | Age: 59
End: 2024-09-11
Payer: COMMERCIAL

## 2024-09-11 VITALS
DIASTOLIC BLOOD PRESSURE: 72 MMHG | BODY MASS INDEX: 25.61 KG/M2 | HEIGHT: 64 IN | SYSTOLIC BLOOD PRESSURE: 137 MMHG | HEART RATE: 62 BPM | WEIGHT: 150 LBS

## 2024-09-11 DIAGNOSIS — R00.2 PALPITATIONS: ICD-10-CM

## 2024-09-11 DIAGNOSIS — R42 DIZZINESS: Primary | ICD-10-CM

## 2024-09-11 PROCEDURE — 99203 OFFICE O/P NEW LOW 30 MIN: CPT | Performed by: INTERNAL MEDICINE

## 2024-09-11 PROCEDURE — 93000 ELECTROCARDIOGRAM COMPLETE: CPT | Performed by: INTERNAL MEDICINE

## 2024-09-11 RX ORDER — ESTRADIOL 0.05 MG/D
1 PATCH, EXTENDED RELEASE TRANSDERMAL WEEKLY
COMMUNITY
Start: 2024-08-28

## 2024-09-11 NOTE — ASSESSMENT & PLAN NOTE
Patient with dizziness issues clinically very symptomatic with vertigo no significant changes on EKG patient does not have a clear-cut positional component would make me think that she has been orthostatic as well.  Do not feel cardiac dysrhythmias or likely the case as she has not had any previous bradycardic problems and no change on EKG would not recommend further testing at this point

## 2024-09-11 NOTE — ASSESSMENT & PLAN NOTE
Patient with stable symptomatic heart palpitations from benign PACs/PVCs no further medical treatment needed from a symptom standpoint at this point.  Her EKG remained stable

## 2024-09-13 ENCOUNTER — PATIENT ROUNDING (BHMG ONLY) (OUTPATIENT)
Dept: CARDIOLOGY | Facility: CLINIC | Age: 59
End: 2024-09-13
Payer: COMMERCIAL

## 2024-09-13 NOTE — PROGRESS NOTES
A SmartStart message had been sent to the patient for PATIENT ROUNDING with [facility]

## 2024-09-23 RX ORDER — LEVOTHYROXINE SODIUM 125 UG/1
125 TABLET ORAL DAILY
Qty: 90 TABLET | Refills: 0 | Status: SHIPPED | OUTPATIENT
Start: 2024-09-23

## 2024-11-08 ENCOUNTER — OFFICE VISIT (OUTPATIENT)
Dept: FAMILY MEDICINE CLINIC | Facility: CLINIC | Age: 59
End: 2024-11-08
Payer: COMMERCIAL

## 2024-11-08 VITALS
WEIGHT: 152 LBS | OXYGEN SATURATION: 99 % | TEMPERATURE: 97 F | RESPIRATION RATE: 16 BRPM | HEIGHT: 64 IN | DIASTOLIC BLOOD PRESSURE: 70 MMHG | HEART RATE: 71 BPM | BODY MASS INDEX: 25.95 KG/M2 | SYSTOLIC BLOOD PRESSURE: 122 MMHG

## 2024-11-08 DIAGNOSIS — E03.9 ACQUIRED HYPOTHYROIDISM: Primary | ICD-10-CM

## 2024-11-08 DIAGNOSIS — M81.0 OSTEOPOROSIS, UNSPECIFIED OSTEOPOROSIS TYPE, UNSPECIFIED PATHOLOGICAL FRACTURE PRESENCE: ICD-10-CM

## 2024-11-08 DIAGNOSIS — Z00.00 ANNUAL PHYSICAL EXAM: ICD-10-CM

## 2024-11-08 DIAGNOSIS — K59.00 CONSTIPATION, UNSPECIFIED CONSTIPATION TYPE: ICD-10-CM

## 2024-11-08 NOTE — PROGRESS NOTES
"Chief Complaint  Hypothyroidism    Subjective         Payal Eldridge is a 59 y.o. female who presents to [facility]    59 years old comes to the clinic today to follow-up.    Hypothyroid; stable and controlled on levothyroxine    Patient reports resolved the vertigo which lasted for 3 months.  Seen by cardiologist as well.    Patient does report occasional abdominal discomfort and she thinks that it might be due to constipation.  Patient has been having constipation on and off.  Symptoms usually noticeable in the morning and not associated with any blood in stool/lower urinary tract symptoms or any fever/chest pain/shortness of breath or any nausea or any tenderness/skin changes.    Currently reports no other acute concerns,    Objective   Vital Signs:   Vitals:    11/08/24 0701   BP: 122/70   BP Location: Left arm   Patient Position: Sitting   Cuff Size: Adult   Pulse: 71   Resp: 16   Temp: 97 °F (36.1 °C)   TempSrc: Temporal   SpO2: 99%   Weight: 68.9 kg (152 lb)   Height: 162.6 cm (64\")   PainSc:   2      Body mass index is 26.09 kg/m².   Wt Readings from Last 3 Encounters:   11/08/24 68.9 kg (152 lb)   09/11/24 68 kg (150 lb)   07/20/24 67.6 kg (149 lb 0.5 oz)      BP Readings from Last 3 Encounters:   11/08/24 122/70   09/11/24 137/72   07/20/24 148/74        Patient Care Team:  Almas Madsen MD as PCP - General (Family Medicine)  Rebekah Arzate MD as Consulting Physician (Obstetrics and Gynecology)     Physical Exam  Vitals reviewed.   Constitutional:       Appearance: Normal appearance. She is well-developed.   HENT:      Head: Normocephalic and atraumatic.      Right Ear: External ear normal.      Left Ear: External ear normal.      Mouth/Throat:      Pharynx: No oropharyngeal exudate.   Eyes:      Conjunctiva/sclera: Conjunctivae normal.      Pupils: Pupils are equal, round, and reactive to light.   Cardiovascular:      Rate and Rhythm: Normal rate and regular " rhythm.      Heart sounds: No murmur heard.     No friction rub. No gallop.   Pulmonary:      Effort: Pulmonary effort is normal.      Breath sounds: Normal breath sounds. No wheezing or rhonchi.   Abdominal:      General: Bowel sounds are normal. There is no distension.      Palpations: Abdomen is soft.      Tenderness: There is no abdominal tenderness.   Skin:     General: Skin is warm and dry.   Neurological:      Mental Status: She is alert and oriented to person, place, and time.      Cranial Nerves: No cranial nerve deficit.   Psychiatric:         Mood and Affect: Mood and affect normal.         Behavior: Behavior normal.         Thought Content: Thought content normal.         Judgment: Judgment normal.                            Assessment and Plan   Diagnoses and all orders for this visit:    1. Acquired hypothyroidism (Primary)  Comments:  Chronic, controlled on levothyroxine 125 mcg  Orders:  -     TSH Rfx On Abnormal To Free T4; Future  -     CBC & Differential; Future  -     Comprehensive Metabolic Panel; Future  -     Hemoglobin A1c; Future  -     Lipid Panel; Future  -     Urinalysis With Microscopic - Urine, Clean Catch; Future    2. Osteoporosis, unspecified osteoporosis type, unspecified pathological fracture presence  -     TSH Rfx On Abnormal To Free T4; Future  -     CBC & Differential; Future  -     Comprehensive Metabolic Panel; Future  -     Hemoglobin A1c; Future  -     Lipid Panel; Future  -     Urinalysis With Microscopic - Urine, Clean Catch; Future    3. Annual physical exam  -     TSH Rfx On Abnormal To Free T4; Future  -     CBC & Differential; Future  -     Comprehensive Metabolic Panel; Future  -     Hemoglobin A1c; Future  -     Lipid Panel; Future  -     Urinalysis With Microscopic - Urine, Clean Catch; Future    4. Constipation  Comments:  Supportive measures, MiraLAX discussed. Further evaluation if not improved in 2 to 3 weeks          Tobacco Use: Low Risk  (11/8/2024)     Patient History     Smoking Tobacco Use: Never     Smokeless Tobacco Use: Never     Passive Exposure: Not on file            Follow Up   Return in about 6 months (around 5/8/2025) for Next scheduled follow up.  Patient was given instructions and counseling regarding her condition or for health maintenance advice. Please see specific information pulled into the AVS if appropriate.

## 2024-12-20 RX ORDER — LEVOTHYROXINE SODIUM 125 UG/1
125 TABLET ORAL DAILY
Qty: 90 TABLET | Refills: 0 | Status: SHIPPED | OUTPATIENT
Start: 2024-12-20

## 2025-02-03 DIAGNOSIS — G89.29 CHRONIC BILATERAL LOWER ABDOMINAL PAIN: Primary | ICD-10-CM

## 2025-02-03 DIAGNOSIS — R10.2 PELVIC PAIN: ICD-10-CM

## 2025-02-03 DIAGNOSIS — R10.31 CHRONIC BILATERAL LOWER ABDOMINAL PAIN: Primary | ICD-10-CM

## 2025-02-03 DIAGNOSIS — R10.32 CHRONIC BILATERAL LOWER ABDOMINAL PAIN: Primary | ICD-10-CM

## 2025-02-17 ENCOUNTER — HOSPITAL ENCOUNTER (OUTPATIENT)
Dept: CT IMAGING | Facility: HOSPITAL | Age: 60
Discharge: HOME | End: 2025-02-17
Admitting: STUDENT IN AN ORGANIZED HEALTH CARE EDUCATION/TRAINING PROGRAM
Payer: COMMERCIAL

## 2025-02-17 DIAGNOSIS — G89.29 CHRONIC BILATERAL LOWER ABDOMINAL PAIN: ICD-10-CM

## 2025-02-17 DIAGNOSIS — R10.31 CHRONIC BILATERAL LOWER ABDOMINAL PAIN: ICD-10-CM

## 2025-02-17 DIAGNOSIS — R10.2 PELVIC PAIN: ICD-10-CM

## 2025-02-17 DIAGNOSIS — R10.32 CHRONIC BILATERAL LOWER ABDOMINAL PAIN: ICD-10-CM

## 2025-02-18 ENCOUNTER — HOSPITAL ENCOUNTER (OUTPATIENT)
Dept: CT IMAGING | Facility: HOSPITAL | Age: 60
Discharge: HOME | End: 2025-02-18
Admitting: STUDENT IN AN ORGANIZED HEALTH CARE EDUCATION/TRAINING PROGRAM
Payer: COMMERCIAL

## 2025-02-18 DIAGNOSIS — R10.2 PELVIC PAIN: ICD-10-CM

## 2025-02-18 DIAGNOSIS — R10.31 CHRONIC BILATERAL LOWER ABDOMINAL PAIN: ICD-10-CM

## 2025-02-18 DIAGNOSIS — G89.29 CHRONIC BILATERAL LOWER ABDOMINAL PAIN: ICD-10-CM

## 2025-02-18 DIAGNOSIS — R10.32 CHRONIC BILATERAL LOWER ABDOMINAL PAIN: ICD-10-CM

## 2025-02-18 LAB
CREAT BLDA-MCNC: 1 MG/DL (ref 0.6–1.3)
EGFRCR SERPLBLD CKD-EPI 2021: 64.6 ML/MIN/1.73

## 2025-02-18 PROCEDURE — 25510000001 IOPAMIDOL PER 1 ML: Performed by: STUDENT IN AN ORGANIZED HEALTH CARE EDUCATION/TRAINING PROGRAM

## 2025-02-18 PROCEDURE — 82565 ASSAY OF CREATININE: CPT

## 2025-02-18 PROCEDURE — 74177 CT ABD & PELVIS W/CONTRAST: CPT

## 2025-02-18 RX ORDER — IOPAMIDOL 755 MG/ML
100 INJECTION, SOLUTION INTRAVASCULAR
Status: COMPLETED | OUTPATIENT
Start: 2025-02-18 | End: 2025-02-18

## 2025-02-18 RX ADMIN — IOPAMIDOL 100 ML: 755 INJECTION, SOLUTION INTRAVENOUS at 15:52

## 2025-03-24 RX ORDER — LEVOTHYROXINE SODIUM 125 UG/1
125 TABLET ORAL DAILY
Qty: 90 TABLET | Refills: 0 | Status: SHIPPED | OUTPATIENT
Start: 2025-03-24

## 2025-06-19 RX ORDER — LEVOTHYROXINE SODIUM 125 UG/1
125 TABLET ORAL DAILY
Qty: 90 TABLET | Refills: 0 | Status: SHIPPED | OUTPATIENT
Start: 2025-06-19

## 2025-06-24 RX ORDER — LEVOTHYROXINE SODIUM 125 UG/1
125 TABLET ORAL DAILY
Qty: 90 TABLET | Refills: 0 | OUTPATIENT
Start: 2025-06-24

## 2025-08-08 ENCOUNTER — OFFICE VISIT (OUTPATIENT)
Dept: FAMILY MEDICINE CLINIC | Facility: CLINIC | Age: 60
End: 2025-08-08
Payer: COMMERCIAL

## 2025-08-08 ENCOUNTER — LAB (OUTPATIENT)
Dept: LAB | Facility: HOSPITAL | Age: 60
End: 2025-08-08
Payer: COMMERCIAL

## 2025-08-08 VITALS
TEMPERATURE: 97.7 F | BODY MASS INDEX: 25.61 KG/M2 | DIASTOLIC BLOOD PRESSURE: 74 MMHG | WEIGHT: 150 LBS | HEART RATE: 73 BPM | SYSTOLIC BLOOD PRESSURE: 114 MMHG | OXYGEN SATURATION: 97 % | HEIGHT: 64 IN | RESPIRATION RATE: 16 BRPM

## 2025-08-08 DIAGNOSIS — M81.0 OSTEOPOROSIS, UNSPECIFIED OSTEOPOROSIS TYPE, UNSPECIFIED PATHOLOGICAL FRACTURE PRESENCE: ICD-10-CM

## 2025-08-08 DIAGNOSIS — Z78.0 POSTMENOPAUSE: ICD-10-CM

## 2025-08-08 DIAGNOSIS — E03.9 ACQUIRED HYPOTHYROIDISM: ICD-10-CM

## 2025-08-08 DIAGNOSIS — Z00.00 ANNUAL PHYSICAL EXAM: Primary | ICD-10-CM

## 2025-08-08 DIAGNOSIS — Z23 NEED FOR TDAP VACCINATION: ICD-10-CM

## 2025-08-08 DIAGNOSIS — Z00.00 ANNUAL PHYSICAL EXAM: ICD-10-CM

## 2025-08-08 LAB — TSH SERPL DL<=0.05 MIU/L-ACNC: 0.4 UIU/ML (ref 0.27–4.2)

## 2025-08-08 PROCEDURE — 84443 ASSAY THYROID STIM HORMONE: CPT

## 2025-08-08 PROCEDURE — 36415 COLL VENOUS BLD VENIPUNCTURE: CPT

## (undated) DEVICE — COLON KIT: Brand: MEDLINE INDUSTRIES, INC.

## (undated) DEVICE — EGD OR ERCP KIT: Brand: MEDLINE INDUSTRIES, INC.

## (undated) DEVICE — SOL IRRG H2O PL/BG 1000ML STRL

## (undated) DEVICE — Device: Brand: DEFENDO AIR/WATER/SUCTION AND BIOPSY VALVE